# Patient Record
Sex: FEMALE | Race: WHITE | NOT HISPANIC OR LATINO | Employment: UNEMPLOYED | URBAN - NONMETROPOLITAN AREA
[De-identification: names, ages, dates, MRNs, and addresses within clinical notes are randomized per-mention and may not be internally consistent; named-entity substitution may affect disease eponyms.]

---

## 2023-03-03 LAB
CELLS COUNTED TOTAL (#) IN BODY FLUID: 100
CLARITY FLUID: CLEAR
COLOR OF BODY FLUID: YELLOW
ERYTHROCYTES (/UL) IN BODY FLUID: 1000 /UL
JOINT FLUID CRYSTALS: NORMAL
LEUKOCYTES (/UL) IN BODY FLUID: 789 /UL
LYMPHOCYTES/100 LEUKOCYTES IN BODY FLUID BY MAN CT: 6 %
MONOCYTES+MACROPHAGES/100 WBC IN BODY FLUID BY MAN CT: 7 %
NEUTROPHILS/100 LEUKOCYTES IN BODY FLUID BY MANUAL COUNT: 87 %

## 2023-03-06 ENCOUNTER — TELEPHONE (OUTPATIENT)
Dept: PRIMARY CARE | Facility: CLINIC | Age: 83
End: 2023-03-06
Payer: MEDICARE

## 2023-03-06 LAB — CRYSTALS PATH REVIEW: NORMAL

## 2023-03-06 NOTE — TELEPHONE ENCOUNTER
----- Message from Rocael Vallejo DO sent at 3/5/2023  7:44 PM EST -----  Please call the patient regarding her abnormal result. Inform Xray consistent with pseudogout but fluid from knee showed no crystals. Hopefully doing much better after steroid injection   Pt informed of recent results.

## 2023-03-07 PROBLEM — G89.29 CHRONIC PAIN OF LEFT KNEE: Status: ACTIVE | Noted: 2023-03-07

## 2023-03-07 PROBLEM — G25.0 BENIGN FAMILIAL TREMOR: Status: ACTIVE | Noted: 2023-03-07

## 2023-03-07 PROBLEM — E78.00 HYPERCHOLESTEROLEMIA: Status: ACTIVE | Noted: 2023-03-07

## 2023-03-07 PROBLEM — I65.29 CAROTID ARTERY STENOSIS: Status: ACTIVE | Noted: 2023-03-07

## 2023-03-07 PROBLEM — M25.559 JOINT PAIN, HIP: Status: ACTIVE | Noted: 2023-03-07

## 2023-03-07 PROBLEM — I49.3 ASYMPTOMATIC PVCS: Status: ACTIVE | Noted: 2023-03-07

## 2023-03-07 PROBLEM — M11.262 CHONDROCALCINOSIS OF LEFT KNEE: Status: ACTIVE | Noted: 2023-03-07

## 2023-03-07 PROBLEM — I10 BENIGN ESSENTIAL HYPERTENSION: Status: ACTIVE | Noted: 2023-03-07

## 2023-03-07 PROBLEM — M25.562 CHRONIC PAIN OF LEFT KNEE: Status: ACTIVE | Noted: 2023-03-07

## 2023-03-07 PROBLEM — M79.10 MYALGIA: Status: ACTIVE | Noted: 2023-03-07

## 2023-03-07 PROBLEM — M11.269 CHONDROCALCINOSIS OF KNEE: Status: ACTIVE | Noted: 2023-03-07

## 2023-09-26 ENCOUNTER — APPOINTMENT (OUTPATIENT)
Dept: PRIMARY CARE | Facility: CLINIC | Age: 83
End: 2023-09-26
Payer: MEDICARE

## 2023-12-20 ENCOUNTER — OFFICE VISIT (OUTPATIENT)
Dept: PRIMARY CARE | Facility: CLINIC | Age: 83
End: 2023-12-20
Payer: MEDICARE

## 2023-12-20 VITALS
DIASTOLIC BLOOD PRESSURE: 80 MMHG | HEART RATE: 69 BPM | SYSTOLIC BLOOD PRESSURE: 174 MMHG | OXYGEN SATURATION: 96 % | HEIGHT: 60 IN | WEIGHT: 141 LBS | BODY MASS INDEX: 27.68 KG/M2

## 2023-12-20 DIAGNOSIS — Z78.0 POST-MENOPAUSAL: ICD-10-CM

## 2023-12-20 DIAGNOSIS — M17.11 PRIMARY OSTEOARTHRITIS OF RIGHT KNEE: ICD-10-CM

## 2023-12-20 DIAGNOSIS — E78.00 HYPERCHOLESTEROLEMIA: ICD-10-CM

## 2023-12-20 DIAGNOSIS — I10 BENIGN ESSENTIAL HYPERTENSION: Primary | ICD-10-CM

## 2023-12-20 DIAGNOSIS — I49.3 ASYMPTOMATIC PVCS: ICD-10-CM

## 2023-12-20 DIAGNOSIS — M11.261 CHONDROCALCINOSIS OF RIGHT KNEE: ICD-10-CM

## 2023-12-20 DIAGNOSIS — D49.2 SKIN NEOPLASM: ICD-10-CM

## 2023-12-20 LAB
ALBUMIN SERPL BCP-MCNC: 4.3 G/DL (ref 3.4–5)
ANION GAP SERPL CALC-SCNC: 14 MMOL/L (ref 10–20)
BUN SERPL-MCNC: 17 MG/DL (ref 6–23)
CALCIUM SERPL-MCNC: 10 MG/DL (ref 8.6–10.3)
CHLORIDE SERPL-SCNC: 101 MMOL/L (ref 98–107)
CHOLEST SERPL-MCNC: 161 MG/DL (ref 0–199)
CHOLESTEROL/HDL RATIO: 2.5
CO2 SERPL-SCNC: 31 MMOL/L (ref 21–32)
CREAT SERPL-MCNC: 0.73 MG/DL (ref 0.5–1.05)
GFR SERPL CREATININE-BSD FRML MDRD: 81 ML/MIN/1.73M*2
GLUCOSE SERPL-MCNC: 112 MG/DL (ref 74–99)
HDLC SERPL-MCNC: 65.4 MG/DL
LDLC SERPL CALC-MCNC: 72 MG/DL
NON HDL CHOLESTEROL: 96 MG/DL (ref 0–149)
PHOSPHATE SERPL-MCNC: 3.2 MG/DL (ref 2.5–4.9)
POTASSIUM SERPL-SCNC: 4.1 MMOL/L (ref 3.5–5.3)
SODIUM SERPL-SCNC: 142 MMOL/L (ref 136–145)
TRIGL SERPL-MCNC: 120 MG/DL (ref 0–149)
VLDL: 24 MG/DL (ref 0–40)

## 2023-12-20 PROCEDURE — 80069 RENAL FUNCTION PANEL: CPT

## 2023-12-20 PROCEDURE — 20610 DRAIN/INJ JOINT/BURSA W/O US: CPT | Performed by: FAMILY MEDICINE

## 2023-12-20 PROCEDURE — G0439 PPPS, SUBSEQ VISIT: HCPCS | Performed by: FAMILY MEDICINE

## 2023-12-20 PROCEDURE — 3078F DIAST BP <80 MM HG: CPT | Performed by: FAMILY MEDICINE

## 2023-12-20 PROCEDURE — 88305 TISSUE EXAM BY PATHOLOGIST: CPT | Mod: TC,DER | Performed by: FAMILY MEDICINE

## 2023-12-20 PROCEDURE — 1159F MED LIST DOCD IN RCRD: CPT | Performed by: FAMILY MEDICINE

## 2023-12-20 PROCEDURE — 99214 OFFICE O/P EST MOD 30 MIN: CPT | Performed by: FAMILY MEDICINE

## 2023-12-20 PROCEDURE — 36415 COLL VENOUS BLD VENIPUNCTURE: CPT

## 2023-12-20 PROCEDURE — 80061 LIPID PANEL: CPT

## 2023-12-20 PROCEDURE — 1170F FXNL STATUS ASSESSED: CPT | Performed by: FAMILY MEDICINE

## 2023-12-20 PROCEDURE — 88305 TISSUE EXAM BY PATHOLOGIST: CPT | Performed by: DERMATOLOGY

## 2023-12-20 PROCEDURE — 11102 TANGNTL BX SKIN SINGLE LES: CPT | Performed by: FAMILY MEDICINE

## 2023-12-20 PROCEDURE — 3077F SYST BP >= 140 MM HG: CPT | Performed by: FAMILY MEDICINE

## 2023-12-20 PROCEDURE — 1160F RVW MEDS BY RX/DR IN RCRD: CPT | Performed by: FAMILY MEDICINE

## 2023-12-20 PROCEDURE — 1036F TOBACCO NON-USER: CPT | Performed by: FAMILY MEDICINE

## 2023-12-20 RX ORDER — ROSUVASTATIN CALCIUM 10 MG/1
10 TABLET, COATED ORAL NIGHTLY
COMMUNITY
End: 2023-12-20 | Stop reason: SDUPTHER

## 2023-12-20 RX ORDER — HYDROCHLOROTHIAZIDE 12.5 MG/1
12.5 TABLET ORAL
COMMUNITY
End: 2023-12-20 | Stop reason: SDUPTHER

## 2023-12-20 RX ORDER — ROSUVASTATIN CALCIUM 10 MG/1
10 TABLET, COATED ORAL NIGHTLY
Qty: 90 TABLET | Refills: 3 | Status: SHIPPED | OUTPATIENT
Start: 2023-12-20 | End: 2024-12-19

## 2023-12-20 RX ORDER — HYDROCHLOROTHIAZIDE 12.5 MG/1
12.5 TABLET ORAL
Qty: 90 TABLET | Refills: 3 | Status: SHIPPED | OUTPATIENT
Start: 2023-12-20 | End: 2024-12-19

## 2023-12-20 RX ORDER — AMPICILLIN TRIHYDRATE 250 MG
CAPSULE ORAL
COMMUNITY

## 2023-12-20 RX ORDER — ACETAMINOPHEN 500 MG
TABLET ORAL
COMMUNITY

## 2023-12-20 RX ADMIN — LIDOCAINE HYDROCHLORIDE 1.5 ML: 10 INJECTION INFILTRATION; PERINEURAL at 09:18

## 2023-12-20 RX ADMIN — TRIAMCINOLONE ACETONIDE 40 MG: 40 INJECTION, SUSPENSION INTRA-ARTICULAR; INTRAMUSCULAR at 09:18

## 2023-12-20 ASSESSMENT — ACTIVITIES OF DAILY LIVING (ADL)
TAKING_MEDICATION: INDEPENDENT
DOING_HOUSEWORK: INDEPENDENT
BATHING: INDEPENDENT
DRESSING: INDEPENDENT
GROCERY_SHOPPING: INDEPENDENT
MANAGING_FINANCES: INDEPENDENT

## 2023-12-20 ASSESSMENT — ENCOUNTER SYMPTOMS
PALPITATIONS: 0
SINUS PRESSURE: 0
ACTIVITY CHANGE: 1
HYPERTENSION: 1
SHORTNESS OF BREATH: 0
APPETITE CHANGE: 0
CHEST TIGHTNESS: 0
UNEXPECTED WEIGHT CHANGE: 0
WHEEZING: 0
FATIGUE: 0
FEVER: 0

## 2023-12-20 ASSESSMENT — PATIENT HEALTH QUESTIONNAIRE - PHQ9
1. LITTLE INTEREST OR PLEASURE IN DOING THINGS: NOT AT ALL
2. FEELING DOWN, DEPRESSED OR HOPELESS: NOT AT ALL
SUM OF ALL RESPONSES TO PHQ9 QUESTIONS 1 AND 2: 0

## 2023-12-20 NOTE — PROGRESS NOTES
Subjective   Reason for Visit: Adelia Huddleston is an 83 y.o. female here for a Medicare Wellness visit.  And yearly visit  She has history of PVCs hypertension hypercholesterolemia chondrocalcinosis of knees chronic knee pain  Asymptomatic carotid stenosis of note blockage was less than 50% approximately 8 9 years ago.  Patient denies any symptoms consistent with TIA carotid distribution.  She takes statin non-smoker generally pressure controlled needs further adjustment the pressure being elevated today however.    Past Medical, Surgical, and Family History reviewed and updated in chart.    Reviewed all medications by prescribing practitioner or clinical pharmacist (such as prescriptions, OTCs, herbal therapies and supplements) and documented in the medical record.    Hypertension  Pertinent negatives include no chest pain, palpitations or shortness of breath.     R Knee bothering her again   Hard to bend knee  Hard to start walking   She had steroid injection which gave her 4 months of very good relief per her  Has had x-ray of both knees  Patient very much wants to have steroid injection again today  Moderate osteoarthritis bilaterally along with probable CPDD disease  Patient otherwise overall feels fairly good    Patient Care Team:  Rocael Vallejo DO as PCP - General  Rocael Vallejo DO as PCP - [employer] PCP     Review of Systems   Constitutional:  Positive for activity change. Negative for appetite change, fatigue, fever and unexpected weight change.        Discomfort knee is decreased her activity level   HENT:  Negative for congestion, postnasal drip and sinus pressure.    Eyes:  Negative for visual disturbance.   Respiratory:  Negative for chest tightness, shortness of breath and wheezing.    Cardiovascular:  Negative for chest pain, palpitations and leg swelling.       Objective   Vitals:  /80   Pulse 69   Ht 1.524 m (5')   Wt 64 kg (141 lb)   SpO2 96%   BMI 27.54 kg/m²        Physical Exam  General: alert, no apparent distress, good hygiene   HEAD:  Normocephalic, atraumatic    EARS:  EAC patent, TMs normal,   EYES:  sclera white, KAR, conjunctiva noninjected  NOSE: Nasal passages patent   MOUTH: Pharynx clear, tongue uvula midline, grade 2 airway, good dentition for age   Neck:  supple, no masses, thyroid non enlarged non nodular, no cervical adenopathy,  Lungs:  no wheezing, no rales , no rhonchi, normal respiratory pattern, breath sounds not diminished  Heart:  regular rate and  rhythm, 2/6 RUSB murmur, no ectopy, no S3 or S4, no carotid bruits  Abdomen:  soft NT,BS + ,  no organomegaly, no masses, no bruits  Extremities:  trace L>R  edema, no cyanosis, no clubbing,  2+ posterior tibialis pulse    Psych:  speech fluent, normal affect, normal thought process  Skin:  no rashes, + concerning skin lesion anter left ear present many months crusted center skin appear retracted around it , normal texture  R Knee minimal effusion, tender medial and lateral joint line Patient ID: Adelia Huddelston is a 83 y.o. female.    Joint Injection Large/Arthrocentesis: R knee on 12/20/2023 9:18 AM  Details: 22 G needle, anterolateral approach  Medications: 40 mg triamcinolone acetonide 40 mg/mL; 1.5 mL lidocaine 10 mg/mL (1 %)  Aspirate: 33 mL yellow and clear  Consent was given by the patient. Immediately prior to procedure a time out was called to verify the correct patient, procedure, equipment, support staff and site/side marked as required. Patient was prepped and draped in the usual sterile fashion.       Shave biopsy skin  Anesthetized 2% lidocaine with epinephrine 1.2 ml  Shave biopsy performed concerning lesion anterior to left ear  Hyfrecated afterwards good hemostasis was obtained  Bacitracin bandage applied  Patient tolerated very well no complications  Patient understands may need Mohs surgery afterwards if positive for skin cancer which I feel is very likely      Assessment/Plan   Problem  List Items Addressed This Visit       Asymptomatic PVCs    Benign essential hypertension - Primary    Relevant Medications    hydroCHLOROthiazide (HYDRODiuril) 12.5 mg tablet    Other Relevant Orders    Renal Function Panel (Completed)    Chondrocalcinosis of knee    Hypercholesterolemia    Relevant Medications    rosuvastatin (Crestor) 10 mg tablet    Other Relevant Orders    Lipid Panel (Completed)     Other Visit Diagnoses       Skin neoplasm        Relevant Orders    Dermatopathology-[facility]    Post-menopausal        Relevant Orders    XR DEXA bone density    Primary osteoarthritis of right knee        Relevant Orders    Joint Injection Large/Arthrocentesis        Patient is fasting   Phone has no messaging   Patient declines vaccines pneumonia influenza shingles and COVID.  Blood pressure is elevated patient will monitor home will make her medicine lisinopril HCTZ if not at goal otherwise continue hydrochlorothiazide.  She did miss her pill this morning but do not feel that would cause her pressure elevate this much.  Lesion next to left ear felt to be SCC or BCC

## 2023-12-21 ENCOUNTER — TELEPHONE (OUTPATIENT)
Dept: PRIMARY CARE | Facility: CLINIC | Age: 83
End: 2023-12-21
Payer: MEDICARE

## 2023-12-21 DIAGNOSIS — R73.9 HYPERGLYCEMIA: Primary | ICD-10-CM

## 2023-12-21 RX ORDER — LISINOPRIL AND HYDROCHLOROTHIAZIDE 10; 12.5 MG/1; MG/1
1 TABLET ORAL DAILY
Qty: 90 TABLET | Refills: 3 | Status: SHIPPED | OUTPATIENT
Start: 2023-12-21 | End: 2024-12-20

## 2023-12-21 NOTE — TELEPHONE ENCOUNTER
Prescription sent to pharmacy for patient.  Continue to monitor blood pressure if still remains elevated we will send in higher strength on medicine

## 2023-12-26 LAB
LABORATORY COMMENT REPORT: NORMAL
PATH REPORT.FINAL DX SPEC: NORMAL
PATH REPORT.GROSS SPEC: NORMAL
PATH REPORT.MICROSCOPIC SPEC OTHER STN: NORMAL
PATH REPORT.RELEVANT HX SPEC: NORMAL
PATH REPORT.TOTAL CANCER: NORMAL

## 2023-12-26 RX ORDER — LIDOCAINE HYDROCHLORIDE 10 MG/ML
1.5 INJECTION INFILTRATION; PERINEURAL
Status: COMPLETED | OUTPATIENT
Start: 2023-12-20 | End: 2023-12-20

## 2023-12-26 RX ORDER — TRIAMCINOLONE ACETONIDE 40 MG/ML
40 INJECTION, SUSPENSION INTRA-ARTICULAR; INTRAMUSCULAR
Status: COMPLETED | OUTPATIENT
Start: 2023-12-20 | End: 2023-12-20

## 2024-01-18 LAB
LABORATORY COMMENT REPORT: NORMAL
PATH REPORT.FINAL DX SPEC: NORMAL
PATH REPORT.TOTAL CANCER: NORMAL

## 2024-02-20 ENCOUNTER — TELEPHONE (OUTPATIENT)
Dept: PRIMARY CARE | Facility: CLINIC | Age: 84
End: 2024-02-20
Payer: MEDICARE

## 2024-02-20 DIAGNOSIS — D49.2 SKIN NEOPLASM: Primary | ICD-10-CM

## 2024-02-20 NOTE — TELEPHONE ENCOUNTER
Pt saw JT a month ago and got some spots taken off she wanted to see you I assured her that you do MSOs at the end of the day she wants before noon. Wasn't sure if you would do a derm referral for her since your scheduling so far out.

## 2024-02-20 NOTE — TELEPHONE ENCOUNTER
Pt informed. She said that she will just call her cancer specialist. She just thought she'd try here since we're closer.

## 2024-12-09 ENCOUNTER — APPOINTMENT (OUTPATIENT)
Dept: PRIMARY CARE | Facility: CLINIC | Age: 84
End: 2024-12-09
Payer: MEDICARE

## 2024-12-09 VITALS
HEART RATE: 70 BPM | BODY MASS INDEX: 25.78 KG/M2 | DIASTOLIC BLOOD PRESSURE: 70 MMHG | WEIGHT: 132 LBS | SYSTOLIC BLOOD PRESSURE: 136 MMHG | OXYGEN SATURATION: 98 %

## 2024-12-09 DIAGNOSIS — Z00.00 ROUTINE GENERAL MEDICAL EXAMINATION AT HEALTH CARE FACILITY: Primary | ICD-10-CM

## 2024-12-09 DIAGNOSIS — B35.1 NAIL FUNGUS: ICD-10-CM

## 2024-12-09 DIAGNOSIS — R73.09 OTHER ABNORMAL GLUCOSE: ICD-10-CM

## 2024-12-09 DIAGNOSIS — R00.2 PALPITATIONS: ICD-10-CM

## 2024-12-09 DIAGNOSIS — E78.00 HYPERCHOLESTEROLEMIA: ICD-10-CM

## 2024-12-09 DIAGNOSIS — Z13.1 SCREENING FOR DIABETES MELLITUS: ICD-10-CM

## 2024-12-09 DIAGNOSIS — I10 BENIGN ESSENTIAL HYPERTENSION: ICD-10-CM

## 2024-12-09 LAB
ALBUMIN SERPL BCP-MCNC: 4.1 G/DL (ref 3.4–5)
ALP SERPL-CCNC: 39 U/L (ref 33–136)
ALT SERPL W P-5'-P-CCNC: 16 U/L (ref 7–45)
ANION GAP SERPL CALC-SCNC: 13 MMOL/L (ref 10–20)
AST SERPL W P-5'-P-CCNC: 23 U/L (ref 9–39)
BASOPHILS # BLD AUTO: 0.05 X10*3/UL (ref 0–0.1)
BASOPHILS NFR BLD AUTO: 0.6 %
BILIRUB SERPL-MCNC: 0.8 MG/DL (ref 0–1.2)
BUN SERPL-MCNC: 22 MG/DL (ref 6–23)
CALCIUM SERPL-MCNC: 9.6 MG/DL (ref 8.6–10.3)
CHLORIDE SERPL-SCNC: 102 MMOL/L (ref 98–107)
CHOLEST SERPL-MCNC: 158 MG/DL (ref 0–199)
CHOLESTEROL/HDL RATIO: 2.5
CO2 SERPL-SCNC: 31 MMOL/L (ref 21–32)
CREAT SERPL-MCNC: 0.9 MG/DL (ref 0.5–1.05)
EGFRCR SERPLBLD CKD-EPI 2021: 62 ML/MIN/1.73M*2
EOSINOPHIL # BLD AUTO: 0.11 X10*3/UL (ref 0–0.4)
EOSINOPHIL NFR BLD AUTO: 1.3 %
ERYTHROCYTE [DISTWIDTH] IN BLOOD BY AUTOMATED COUNT: 12.7 % (ref 11.5–14.5)
EST. AVERAGE GLUCOSE BLD GHB EST-MCNC: 117 MG/DL
GLUCOSE SERPL-MCNC: 104 MG/DL (ref 74–99)
HBA1C MFR BLD: 5.7 %
HCT VFR BLD AUTO: 47.7 % (ref 36–46)
HDLC SERPL-MCNC: 62.3 MG/DL
HGB BLD-MCNC: 15.6 G/DL (ref 12–16)
IMM GRANULOCYTES # BLD AUTO: 0.03 X10*3/UL (ref 0–0.5)
IMM GRANULOCYTES NFR BLD AUTO: 0.3 % (ref 0–0.9)
LDLC SERPL CALC-MCNC: 63 MG/DL
LYMPHOCYTES # BLD AUTO: 1.85 X10*3/UL (ref 0.8–3)
LYMPHOCYTES NFR BLD AUTO: 21.2 %
MCH RBC QN AUTO: 31.8 PG (ref 26–34)
MCHC RBC AUTO-ENTMCNC: 32.7 G/DL (ref 32–36)
MCV RBC AUTO: 97 FL (ref 80–100)
MONOCYTES # BLD AUTO: 0.5 X10*3/UL (ref 0.05–0.8)
MONOCYTES NFR BLD AUTO: 5.7 %
NEUTROPHILS # BLD AUTO: 6.18 X10*3/UL (ref 1.6–5.5)
NEUTROPHILS NFR BLD AUTO: 70.9 %
NON HDL CHOLESTEROL: 96 MG/DL (ref 0–149)
NRBC BLD-RTO: 0 /100 WBCS (ref 0–0)
PLATELET # BLD AUTO: 161 X10*3/UL (ref 150–450)
POTASSIUM SERPL-SCNC: 4.2 MMOL/L (ref 3.5–5.3)
PROT SERPL-MCNC: 6.2 G/DL (ref 6.4–8.2)
RBC # BLD AUTO: 4.9 X10*6/UL (ref 4–5.2)
SODIUM SERPL-SCNC: 142 MMOL/L (ref 136–145)
TRIGL SERPL-MCNC: 166 MG/DL (ref 0–149)
VLDL: 33 MG/DL (ref 0–40)
WBC # BLD AUTO: 8.7 X10*3/UL (ref 4.4–11.3)

## 2024-12-09 PROCEDURE — 1160F RVW MEDS BY RX/DR IN RCRD: CPT

## 2024-12-09 PROCEDURE — 3075F SYST BP GE 130 - 139MM HG: CPT

## 2024-12-09 PROCEDURE — 85025 COMPLETE CBC W/AUTO DIFF WBC: CPT

## 2024-12-09 PROCEDURE — G0439 PPPS, SUBSEQ VISIT: HCPCS

## 2024-12-09 PROCEDURE — 1170F FXNL STATUS ASSESSED: CPT

## 2024-12-09 PROCEDURE — 80053 COMPREHEN METABOLIC PANEL: CPT

## 2024-12-09 PROCEDURE — 1159F MED LIST DOCD IN RCRD: CPT

## 2024-12-09 PROCEDURE — 93000 ELECTROCARDIOGRAM COMPLETE: CPT

## 2024-12-09 PROCEDURE — 1036F TOBACCO NON-USER: CPT

## 2024-12-09 PROCEDURE — 83036 HEMOGLOBIN GLYCOSYLATED A1C: CPT

## 2024-12-09 PROCEDURE — 3078F DIAST BP <80 MM HG: CPT

## 2024-12-09 PROCEDURE — 80061 LIPID PANEL: CPT

## 2024-12-09 RX ORDER — CICLOPIROX OLAMINE 7.7 MG/100ML
1 SUSPENSION TOPICAL 2 TIMES DAILY
Qty: 30 ML | Refills: 0 | Status: SHIPPED | OUTPATIENT
Start: 2024-12-09

## 2024-12-09 RX ORDER — ROSUVASTATIN CALCIUM 10 MG/1
10 TABLET, COATED ORAL NIGHTLY
Qty: 90 TABLET | Refills: 3 | Status: SHIPPED | OUTPATIENT
Start: 2024-12-09 | End: 2025-12-09

## 2024-12-09 RX ORDER — LISINOPRIL AND HYDROCHLOROTHIAZIDE 10; 12.5 MG/1; MG/1
1 TABLET ORAL DAILY
Qty: 90 TABLET | Refills: 3 | Status: SHIPPED | OUTPATIENT
Start: 2024-12-09 | End: 2025-12-09

## 2024-12-09 ASSESSMENT — PATIENT HEALTH QUESTIONNAIRE - PHQ9
SUM OF ALL RESPONSES TO PHQ9 QUESTIONS 1 AND 2: 0
1. LITTLE INTEREST OR PLEASURE IN DOING THINGS: NOT AT ALL
2. FEELING DOWN, DEPRESSED OR HOPELESS: NOT AT ALL

## 2024-12-09 ASSESSMENT — ACTIVITIES OF DAILY LIVING (ADL)
GROCERY_SHOPPING: INDEPENDENT
MANAGING_FINANCES: INDEPENDENT
DRESSING: INDEPENDENT
BATHING: INDEPENDENT
DOING_HOUSEWORK: INDEPENDENT
TAKING_MEDICATION: INDEPENDENT

## 2024-12-09 NOTE — PROGRESS NOTES
Subjective   Reason for Visit: Adelia Huddleston is an 84 y.o. female here for a Medicare Wellness visit.     Past Medical, Surgical, and Family History reviewed and updated in chart.    Reviewed all medications by prescribing practitioner or clinical pharmacist (such as prescriptions, OTCs, herbal therapies and supplements) and documented in the medical record.    HPI  Adelia presents for medicare wellness  Last visit with Dr. Vallejo was 12/20/2023    Has noticed she has had a couple weak spells  Has noticed heart has beat fast a couple times, does not happen very often   Denies syncopal episodes  No headache, no chest pain, no SOB     HTN  -Switched from hydrochlorothiazide 12.5mg to lisinopril hydrochlorothiazide 10-12.5mg at last visit with Dr. Vallejo     Patient Care Team:  Kael JOHNSON DO as PCP - General (Family Medicine)  Rocael Vallejo DO as PCP - [employer] PCP     Review of Systems  10 point review of systems performed and is negative except as noted in the HPI.    Current Outpatient Medications on File Prior to Visit   Medication Sig Dispense Refill    cholecalciferol (Vitamin D-3) 50 mcg (2,000 unit) capsule Take by mouth.      fish oil concentrate (Omega-3) 120-180 mg capsule Take 1 capsule (1 g) by mouth 2 times a day.      red yeast rice 600 mg capsule Take by mouth once daily.       No current facility-administered medications on file prior to visit.     Objective   Vitals:  /70   Pulse 70   Wt 59.9 kg (132 lb)   SpO2 98%   BMI 25.78 kg/m²       Physical Exam  Constitutional:       General: She is not in acute distress.     Appearance: Normal appearance. She is not ill-appearing or toxic-appearing.   HENT:      Head: Normocephalic and atraumatic.      Right Ear: Ear canal and external ear normal. There is impacted cerumen.      Left Ear: Ear canal and external ear normal. There is impacted cerumen.      Nose: Nose normal.      Mouth/Throat:      Mouth: Mucous membranes are  moist.      Pharynx: Oropharynx is clear. No oropharyngeal exudate or posterior oropharyngeal erythema.   Eyes:      Conjunctiva/sclera: Conjunctivae normal.      Pupils: Pupils are equal, round, and reactive to light.   Neck:      Vascular: No carotid bruit.   Cardiovascular:      Rate and Rhythm: Normal rate. Rhythm irregular.      Pulses: Normal pulses.      Heart sounds: Murmur (RUSB) heard.   Pulmonary:      Effort: Pulmonary effort is normal.      Breath sounds: Normal breath sounds. No wheezing, rhonchi or rales.   Abdominal:      General: Bowel sounds are normal. There is no distension.      Palpations: Abdomen is soft. There is no mass.      Tenderness: There is no abdominal tenderness. There is no guarding or rebound.   Musculoskeletal:         General: Normal range of motion.      Cervical back: Normal range of motion and neck supple.   Lymphadenopathy:      Cervical: No cervical adenopathy.   Skin:     General: Skin is warm and dry.      Comments: Nails of L hand are yellowed and have some overgrowth and separation from the nailbed.    Neurological:      Mental Status: She is alert and oriented to person, place, and time.   Psychiatric:         Mood and Affect: Mood normal.         Behavior: Behavior normal.         Assessment & Plan  Routine general medical examination at health care facility         Benign essential hypertension  Continue lisinopril hydrochlorothiazide 10-12.5mg  Orders:    CBC and Auto Differential    lisinopriL-hydrochlorothiazide 10-12.5 mg tablet; Take 1 tablet by mouth once daily.    Hypercholesterolemia  Low cholesterol diet   Orders:    Lipid Panel    rosuvastatin (Crestor) 10 mg tablet; Take 1 tablet (10 mg) by mouth once daily at bedtime.    Palpitations  Per previous note from Dr. Vallejo she has a history of PVCs  ECG - sinus arrhythmia, reviewed with Dr. Willis  Did discuss holter monitor, she declines at this time   Discussed echo, she also declines at this time    Orders:    ECG 12 Lead    Screening for diabetes mellitus    Orders:    Comprehensive Metabolic Panel    Hemoglobin A1C    Other abnormal glucose    Orders:    Hemoglobin A1C    Nail fungus  Apply ciclopirox, discussed can take 40 weeks to notice improvement   Orders:    ciclopirox (Loprox) 0.77 % suspension; Apply 1 Application topically 2 times a day.    Asx carotid stenosis - blockage was less than 50% approximately 8-9 years ago.     Health Maintenance Reminder:  -Blood Work: today  -Hep C:   -Colonoscopy: aged out  -Mammo: aged out  -Dexa >65y: order from 2023 did not do, will consider   -Pap: aged out  -Lung CA Screen: 50-80 - non smoker and aged out  -Shingrix >50: decline  -Pneumovax >65y, <65 if at risk, 5y between <65 and >65 dose: decline  -Flu: decline       Discussed at visit any disease processes that were of concern as well as the risks, benefits and instructions on any new medication provided. Patient (and/or caretaker of patient if present) stated all questions were answered, and they voiced understanding of instructions.

## 2024-12-09 NOTE — ASSESSMENT & PLAN NOTE
Low cholesterol diet   Orders:    Lipid Panel    rosuvastatin (Crestor) 10 mg tablet; Take 1 tablet (10 mg) by mouth once daily at bedtime.

## 2024-12-09 NOTE — PATIENT INSTRUCTIONS
PVC - premature ventricular contractions - PVCs are very common and usually harmless, especially if they only happen occasionally. They can cause a variety of symptoms, including: Feeling like your heart skipped a beat, A fluttering sensation in your chest, and Dizziness or weakness. In most cases, PVCs don't require treatment.       Refills for a year    Let me know if the extra heart beats become more frequent or worse - can do heart monitor and echo     Start solution on your nails - give 3-4 months, let me know if it does not help

## 2025-03-09 ENCOUNTER — HOSPITAL ENCOUNTER (EMERGENCY)
Facility: HOSPITAL | Age: 85
Discharge: HOME | End: 2025-03-10
Attending: EMERGENCY MEDICINE
Payer: MEDICARE

## 2025-03-09 ENCOUNTER — APPOINTMENT (OUTPATIENT)
Dept: RADIOLOGY | Facility: HOSPITAL | Age: 85
End: 2025-03-09
Payer: MEDICARE

## 2025-03-09 ENCOUNTER — HOSPITAL ENCOUNTER (EMERGENCY)
Facility: HOSPITAL | Age: 85
Discharge: OTHER INSTITUTION | End: 2025-03-09
Attending: STUDENT IN AN ORGANIZED HEALTH CARE EDUCATION/TRAINING PROGRAM
Payer: MEDICARE

## 2025-03-09 ENCOUNTER — APPOINTMENT (OUTPATIENT)
Dept: CARDIOLOGY | Facility: HOSPITAL | Age: 85
End: 2025-03-09
Payer: MEDICARE

## 2025-03-09 VITALS
TEMPERATURE: 99.1 F | DIASTOLIC BLOOD PRESSURE: 55 MMHG | HEIGHT: 65 IN | SYSTOLIC BLOOD PRESSURE: 136 MMHG | OXYGEN SATURATION: 94 % | WEIGHT: 135 LBS | HEART RATE: 78 BPM | RESPIRATION RATE: 18 BRPM | BODY MASS INDEX: 22.49 KG/M2

## 2025-03-09 DIAGNOSIS — H53.9 VISION CHANGES: Primary | ICD-10-CM

## 2025-03-09 DIAGNOSIS — G45.9 TIA (TRANSIENT ISCHEMIC ATTACK): Primary | ICD-10-CM

## 2025-03-09 LAB
ALBUMIN SERPL BCP-MCNC: 4.3 G/DL (ref 3.4–5)
ALP SERPL-CCNC: 43 U/L (ref 33–136)
ALT SERPL W P-5'-P-CCNC: 14 U/L (ref 7–45)
ANION GAP SERPL CALC-SCNC: 13 MMOL/L (ref 10–20)
AST SERPL W P-5'-P-CCNC: 19 U/L (ref 9–39)
BASOPHILS # BLD AUTO: 0.04 X10*3/UL (ref 0–0.1)
BASOPHILS NFR BLD AUTO: 0.5 %
BILIRUB SERPL-MCNC: 0.6 MG/DL (ref 0–1.2)
BUN SERPL-MCNC: 22 MG/DL (ref 6–23)
CALCIUM SERPL-MCNC: 9.7 MG/DL (ref 8.6–10.3)
CARDIAC TROPONIN I PNL SERPL HS: 5 NG/L (ref 0–13)
CARDIAC TROPONIN I PNL SERPL HS: 7 NG/L (ref 0–13)
CHLORIDE SERPL-SCNC: 104 MMOL/L (ref 98–107)
CO2 SERPL-SCNC: 27 MMOL/L (ref 21–32)
CREAT SERPL-MCNC: 0.95 MG/DL (ref 0.5–1.05)
EGFRCR SERPLBLD CKD-EPI 2021: 58 ML/MIN/1.73M*2
EOSINOPHIL # BLD AUTO: 0.12 X10*3/UL (ref 0–0.4)
EOSINOPHIL NFR BLD AUTO: 1.4 %
ERYTHROCYTE [DISTWIDTH] IN BLOOD BY AUTOMATED COUNT: 12.1 % (ref 11.5–14.5)
GLUCOSE SERPL-MCNC: 162 MG/DL (ref 74–99)
HCT VFR BLD AUTO: 43.8 % (ref 36–46)
HGB BLD-MCNC: 15 G/DL (ref 12–16)
IMM GRANULOCYTES # BLD AUTO: 0.03 X10*3/UL (ref 0–0.5)
IMM GRANULOCYTES NFR BLD AUTO: 0.3 % (ref 0–0.9)
LYMPHOCYTES # BLD AUTO: 2.34 X10*3/UL (ref 0.8–3)
LYMPHOCYTES NFR BLD AUTO: 26.9 %
MAGNESIUM SERPL-MCNC: 2.08 MG/DL (ref 1.6–2.4)
MCH RBC QN AUTO: 31.9 PG (ref 26–34)
MCHC RBC AUTO-ENTMCNC: 34.2 G/DL (ref 32–36)
MCV RBC AUTO: 93 FL (ref 80–100)
MONOCYTES # BLD AUTO: 0.48 X10*3/UL (ref 0.05–0.8)
MONOCYTES NFR BLD AUTO: 5.5 %
NEUTROPHILS # BLD AUTO: 5.68 X10*3/UL (ref 1.6–5.5)
NEUTROPHILS NFR BLD AUTO: 65.4 %
NRBC BLD-RTO: 0 /100 WBCS (ref 0–0)
PLATELET # BLD AUTO: 209 X10*3/UL (ref 150–450)
POTASSIUM SERPL-SCNC: 3.2 MMOL/L (ref 3.5–5.3)
PROT SERPL-MCNC: 7.1 G/DL (ref 6.4–8.2)
RBC # BLD AUTO: 4.7 X10*6/UL (ref 4–5.2)
SODIUM SERPL-SCNC: 141 MMOL/L (ref 136–145)
WBC # BLD AUTO: 8.7 X10*3/UL (ref 4.4–11.3)

## 2025-03-09 PROCEDURE — 70544 MR ANGIOGRAPHY HEAD W/O DYE: CPT

## 2025-03-09 PROCEDURE — 99285 EMERGENCY DEPT VISIT HI MDM: CPT | Mod: 25 | Performed by: STUDENT IN AN ORGANIZED HEALTH CARE EDUCATION/TRAINING PROGRAM

## 2025-03-09 PROCEDURE — 84484 ASSAY OF TROPONIN QUANT: CPT | Performed by: PHYSICIAN ASSISTANT

## 2025-03-09 PROCEDURE — 36415 COLL VENOUS BLD VENIPUNCTURE: CPT | Performed by: PHYSICIAN ASSISTANT

## 2025-03-09 PROCEDURE — 70450 CT HEAD/BRAIN W/O DYE: CPT | Performed by: RADIOLOGY

## 2025-03-09 PROCEDURE — 70551 MRI BRAIN STEM W/O DYE: CPT

## 2025-03-09 PROCEDURE — 70450 CT HEAD/BRAIN W/O DYE: CPT

## 2025-03-09 PROCEDURE — 96374 THER/PROPH/DIAG INJ IV PUSH: CPT

## 2025-03-09 PROCEDURE — 83735 ASSAY OF MAGNESIUM: CPT | Performed by: STUDENT IN AN ORGANIZED HEALTH CARE EDUCATION/TRAINING PROGRAM

## 2025-03-09 PROCEDURE — 2500000002 HC RX 250 W HCPCS SELF ADMINISTERED DRUGS (ALT 637 FOR MEDICARE OP, ALT 636 FOR OP/ED): Mod: MUE | Performed by: PHYSICIAN ASSISTANT

## 2025-03-09 PROCEDURE — 93005 ELECTROCARDIOGRAM TRACING: CPT

## 2025-03-09 PROCEDURE — 80053 COMPREHEN METABOLIC PANEL: CPT | Performed by: PHYSICIAN ASSISTANT

## 2025-03-09 PROCEDURE — 70547 MR ANGIOGRAPHY NECK W/O DYE: CPT

## 2025-03-09 PROCEDURE — 85025 COMPLETE CBC W/AUTO DIFF WBC: CPT | Performed by: PHYSICIAN ASSISTANT

## 2025-03-09 PROCEDURE — 99284 EMERGENCY DEPT VISIT MOD MDM: CPT | Performed by: EMERGENCY MEDICINE

## 2025-03-09 PROCEDURE — 2500000004 HC RX 250 GENERAL PHARMACY W/ HCPCS (ALT 636 FOR OP/ED): Performed by: PHYSICIAN ASSISTANT

## 2025-03-09 RX ORDER — POTASSIUM CHLORIDE 20 MEQ/1
40 TABLET, EXTENDED RELEASE ORAL ONCE
Status: COMPLETED | OUTPATIENT
Start: 2025-03-09 | End: 2025-03-09

## 2025-03-09 RX ORDER — LORAZEPAM 2 MG/ML
1 INJECTION INTRAMUSCULAR ONCE
Status: COMPLETED | OUTPATIENT
Start: 2025-03-09 | End: 2025-03-09

## 2025-03-09 RX ADMIN — LORAZEPAM 1 MG: 2 INJECTION INTRAMUSCULAR; INTRAVENOUS at 17:23

## 2025-03-09 RX ADMIN — POTASSIUM CHLORIDE 40 MEQ: 1500 TABLET, EXTENDED RELEASE ORAL at 15:53

## 2025-03-09 ASSESSMENT — LIFESTYLE VARIABLES
EVER FELT BAD OR GUILTY ABOUT YOUR DRINKING: NO
HAVE YOU EVER FELT YOU SHOULD CUT DOWN ON YOUR DRINKING: NO
HAVE PEOPLE ANNOYED YOU BY CRITICIZING YOUR DRINKING: NO
EVER HAD A DRINK FIRST THING IN THE MORNING TO STEADY YOUR NERVES TO GET RID OF A HANGOVER: NO
HAVE PEOPLE ANNOYED YOU BY CRITICIZING YOUR DRINKING: NO
EVER HAD A DRINK FIRST THING IN THE MORNING TO STEADY YOUR NERVES TO GET RID OF A HANGOVER: NO
TOTAL SCORE: 0
HAVE YOU EVER FELT YOU SHOULD CUT DOWN ON YOUR DRINKING: NO
TOTAL SCORE: 0
EVER FELT BAD OR GUILTY ABOUT YOUR DRINKING: NO

## 2025-03-09 ASSESSMENT — COLUMBIA-SUICIDE SEVERITY RATING SCALE - C-SSRS
2. HAVE YOU ACTUALLY HAD ANY THOUGHTS OF KILLING YOURSELF?: NO
1. IN THE PAST MONTH, HAVE YOU WISHED YOU WERE DEAD OR WISHED YOU COULD GO TO SLEEP AND NOT WAKE UP?: NO
6. HAVE YOU EVER DONE ANYTHING, STARTED TO DO ANYTHING, OR PREPARED TO DO ANYTHING TO END YOUR LIFE?: NO
2. HAVE YOU ACTUALLY HAD ANY THOUGHTS OF KILLING YOURSELF?: NO
1. IN THE PAST MONTH, HAVE YOU WISHED YOU WERE DEAD OR WISHED YOU COULD GO TO SLEEP AND NOT WAKE UP?: NO
6. HAVE YOU EVER DONE ANYTHING, STARTED TO DO ANYTHING, OR PREPARED TO DO ANYTHING TO END YOUR LIFE?: NO

## 2025-03-09 ASSESSMENT — PAIN - FUNCTIONAL ASSESSMENT: PAIN_FUNCTIONAL_ASSESSMENT: 0-10

## 2025-03-09 ASSESSMENT — PAIN SCALES - GENERAL: PAINLEVEL_OUTOF10: 0 - NO PAIN

## 2025-03-09 NOTE — ED TRIAGE NOTES
Patient from home at 12:30 pm today began to have a headache dizziness and double vision. BP for ems 196/77. Patient is A&Ox4 gcs 15 NIH 0

## 2025-03-09 NOTE — ED PROVIDER NOTES
HPI   Chief Complaint   Patient presents with    Hypertension    Dizziness       History of present illness:  84-year-old female presents emergency room for complaints of double vision and vertigo.  The patient states beginning of symptoms about 2 hours ago.  She states it came on suddenly and she began developing what she felt like was vertigo stating that the room felt like it was spinning about her.  She states that in addition as though she began developing double vision and states it appears that there is to everything.  She denies any falls or head injury or recent trauma or nausea or vomiting or palpitations or chest pain.  She states that she has had cataracts in the past but is never had any other issues of her eyes.  She denies any headache at this time.  She states that she takes medications for hypertension hyperlipidemia.  She denies any other symptoms at this time.    Social history: Negative for alcohol and drug use.    Review of systems:   Gen.: No weight loss, fatigue, anorexia, insomnia, fever.   Eyes: No vision loss,  drainage, eye pain.   ENT: No pharyngitis, dry mouth.   Cardiac: No chest pain, palpitations, syncope, near syncope.   Pulmonary: No shortness of breath, cough, hemoptysis.   Heme/lymph: No swollen glands, fever, bleeding.   GI: No abdominal pain, change in bowel habits, melena, hematemesis, hematochezia, nausea, vomiting, diarrhea.   : No discharge, dysuria, frequency, urgency, hematuria.   Musculoskeletal: No limb pain, joint pain, joint swelling.   Skin: No rashes.   Review of systems is otherwise negative unless stated above or in history of present illness.        Physical exam:  General: Vitals noted, no distress. Afebrile.   EENT: No lymphadenopathy appreciated  Cardiac: Regular, rate, rhythm, no murmur.   Pulmonary: Lungs clear bilaterally with good aeration. No adventitious breath sounds.   Abdomen: Soft, nonsurgical. Nontender. No peritoneal signs. Normoactive bowel  sounds.   Extremities: No peripheral edema.   Skin: No rash.   Neuro: No focal neurologic deficits, NIH of 0      Medical decision making:   Testing: CMP showed potassium 3.2 which was replaced with oral potassium troponin x 2 was negative magnesium 2.08 CBC unremarkable CT scan of the head did not show acute findings interpreted by radiology, MRI of the brain MRA of the head and neck did not show any acute findings at this time as interpreted by radiology  Plan: 84-year-old female presents emergency room for complaints of double vision and vertigo.  The patient states beginning of symptoms about 2 hours ago.  She states it came on suddenly and she began developing what she felt like was vertigo stating that the room felt like it was spinning about her.  She states that in addition as though she began developing double vision and states it appears that there is to everything.  She denies any falls or head injury or recent trauma or nausea or vomiting or palpitations or chest pain.  She states that she has had cataracts in the past but is never had any other issues of her eyes.  She denies any headache at this time.  She states that she takes medications for hypertension hyperlipidemia.  She denies any other symptoms at this time. Neuro: No focal neurologic deficits, NIH of 0.  On exam of the patient's double vision apparently was improved whenever she covered 1 eye of the other.  I did not appreciate any obvious palsy on exam of the eyes .  I explained to the patient the test results at this time and explained to her that I felt that it might be best for her to be transferred to [facility] to be seen by ophthalmology as the MRI and MRAs were negative.  She was in agreement of this plan as well as the family at this time.  The care of the patient was handed over to oncoming attending physician at this time pending transfer to [facility] to be evaluated by ophthalmology.  I did speak with Dr. Marino of  ophthalmology as well who is in agreement this plan the patient will be transferred to the ER at this time.    Impression:   1. Double vision      EKG taken on March 9, 2025 at 1444 shows normal sinus rhythm at 71 no STEMI no T wave inversion normal axis as interpreted by myself      History provided by:  Patient   used: No            Patient History   No past medical history on file.  Past Surgical History:   Procedure Laterality Date    CATARACT EXTRACTION  07/05/2013    Cataract Surgery     No family history on file.  Social History     Tobacco Use    Smoking status: Never    Smokeless tobacco: Never   Substance Use Topics    Alcohol use: Never    Drug use: Never       Physical Exam   ED Triage Vitals   Temperature Heart Rate Respirations BP   03/09/25 1424 03/09/25 1424 03/09/25 1424 03/09/25 1424   37.3 °C (99.1 °F) 79 15 177/70      Pulse Ox Temp src Heart Rate Source Patient Position   03/09/25 1440 -- -- --   (!) 93 %         BP Location FiO2 (%)     -- --             Physical Exam      ED Course & MDM   Diagnoses as of 03/10/25 2312   Vision changes                 No data recorded     [place] Coma Scale Score: 15 (03/09/25 1434 : Taj Kirk RN)       NIH Stroke Scale: 0 (03/09/25 1431 : Abundio Palm PA-C)                   Medical Decision Making      Procedure  Procedures     Abundio Palm PA-C  03/10/25 0047

## 2025-03-09 NOTE — PROGRESS NOTES
For full history, physical exam, and prior hospital course, please see previous ED provider note. This is in addition to the primary record.    Chief Complaint   Patient presents with    Hypertension    Dizziness       Comments/Additional Findings: Patient was signed out to me by Dr Singh at change of shift.   Pending items at this time include transfer to [facility].  Patient had no acute events prior to transfer.            Diagnoses as of 03/09/25 1911   Vision changes       MR angio neck wo IV contrast   Final Result   MRI Brain:        1. Nonspecific white matter changes most likely represent   small-vessel ischemic disease in a patient of this age and also   involve the hank. No evidence for acute ischemic injury.   2. Inflammatory changes of the paranasal sinuses. There is a small   amount of fluid in the left maxillary sinus which could reflect acute   sinusitis in the appropriate clinical setting.             MRA:        1. Limited, motion degraded MRA of the head without proximal large   branch vessel cutoff. Short segment of narrowing of the distal M1   segment of the left MCA is not excluded although this is favored to   be due to motion degradation.   2. Sclerotic changes of the left carotid bifurcation and proximal   left ICA with mild narrowing.   3. Mass in the left neck on source images from MRA of the neck is   inadequately evaluated on the basis of this examination but thought   to most likely be arising from the thyroid gland.        MACRO:   Incidental Finding:  There are few small hypoattenuating nodules   measuring equal to or greater than 1.5 cm in the thyroid gland.   (**-YCF-**)        Instructions:  Further evaluation with nonemergent thyroid ultrasound.   (Managing Incidental Thyroid Nodules Detected on Imaging: White Paper   of the ACR Incidental Thyroid Findings Committee. Kelley Ya. et   al. Journal of the American College of Radiology,Volume 12, Issue 2,   143 - 150.)  [email or web address].ACR.IF.4        Signed by: Radha Mcarthur 3/9/2025 6:27 PM   Dictation workstation:   [id number]      MR angio head wo IV contrast   Final Result   MRI Brain:        1. Nonspecific white matter changes most likely represent   small-vessel ischemic disease in a patient of this age and also   involve the hank. No evidence for acute ischemic injury.   2. Inflammatory changes of the paranasal sinuses. There is a small   amount of fluid in the left maxillary sinus which could reflect acute   sinusitis in the appropriate clinical setting.             MRA:        1. Limited, motion degraded MRA of the head without proximal large   branch vessel cutoff. Short segment of narrowing of the distal M1   segment of the left MCA is not excluded although this is favored to   be due to motion degradation.   2. Sclerotic changes of the left carotid bifurcation and proximal   left ICA with mild narrowing.   3. Mass in the left neck on source images from MRA of the neck is   inadequately evaluated on the basis of this examination but thought   to most likely be arising from the thyroid gland.        MACRO:   Incidental Finding:  There are few small hypoattenuating nodules   measuring equal to or greater than 1.5 cm in the thyroid gland.   (**-YCF-**)        Instructions:  Further evaluation with nonemergent thyroid ultrasound.   (Managing Incidental Thyroid Nodules Detected on Imaging: White Paper   of the ACR Incidental Thyroid Findings Committee. Kelley Ya. et   al. Journal of the American College of Radiology,Volume 12, Issue 2,   143 - 150.) [email or web address].[email or web address]        Signed by: Radha Mcarthur 3/9/2025 6:27 PM   Dictation workstation:   [id number]      MR brain wo IV contrast   Final Result   MRI Brain:        1. Nonspecific white matter changes most likely represent   small-vessel ischemic disease in a patient of this age and also   involve the hank. No evidence for acute ischemic injury.   2. Inflammatory changes  of the paranasal sinuses. There is a small   amount of fluid in the left maxillary sinus which could reflect acute   sinusitis in the appropriate clinical setting.             MRA:        1. Limited, motion degraded MRA of the head without proximal large   branch vessel cutoff. Short segment of narrowing of the distal M1   segment of the left MCA is not excluded although this is favored to   be due to motion degradation.   2. Sclerotic changes of the left carotid bifurcation and proximal   left ICA with mild narrowing.   3. Mass in the left neck on source images from MRA of the neck is   inadequately evaluated on the basis of this examination but thought   to most likely be arising from the thyroid gland.        MACRO:   Incidental Finding:  There are few small hypoattenuating nodules   measuring equal to or greater than 1.5 cm in the thyroid gland.   (**-YCF-**)        Instructions:  Further evaluation with nonemergent thyroid ultrasound.   (Managing Incidental Thyroid Nodules Detected on Imaging: White Paper   of the ACR Incidental Thyroid Findings Committee. Kelley Ya. et   al. Journal of the American College of Radiology,Volume 12, Issue 2,   143 - 150.) [email or web address].IF.[email or web address]        Signed by: Radha Mcarthur 3/9/2025 6:27 PM   Dictation workstation:   [id number]      CT head wo IV contrast   Final Result   No evidence of an acute intracranial process.        MACRO:   None.        Signed by: Bruce Mallory 3/9/2025 3:33 PM   Dictation workstation:   [id number]        Labs Reviewed   CBC WITH AUTO DIFFERENTIAL - Abnormal       Result Value    WBC 8.7      nRBC 0.0      RBC 4.70      Hemoglobin 15.0      Hematocrit 43.8      MCV 93      MCH 31.9      MCHC 34.2      RDW 12.1      Platelets 209      Neutrophils % 65.4      Immature Granulocytes %, Automated 0.3      Lymphocytes % 26.9      Monocytes % 5.5      Eosinophils % 1.4      Basophils % 0.5      Neutrophils Absolute 5.68 (*)     Immature Granulocytes  Absolute, Automated 0.03      Lymphocytes Absolute 2.34      Monocytes Absolute 0.48      Eosinophils Absolute 0.12      Basophils Absolute 0.04     COMPREHENSIVE METABOLIC PANEL - Abnormal    Glucose 162 (*)     Sodium 141      Potassium 3.2 (*)     Chloride 104      Bicarbonate 27      Anion Gap 13      Urea Nitrogen 22      Creatinine 0.95      eGFR 58 (*)     Calcium 9.7      Albumin 4.3      Alkaline Phosphatase 43      Total Protein 7.1      AST 19      Bilirubin, Total 0.6      ALT 14     SERIAL TROPONIN-INITIAL - Normal    Troponin I, High Sensitivity 5      Narrative:     Less than 99th percentile of normal range cutoff-  Female and children under 18 years old <14 ng/L; Male <21 ng/L: Negative  Repeat testing should be performed if clinically indicated.     Female and children under 18 years old 14-50 ng/L; Male 21-50 ng/L:  Consistent with possible cardiac damage and possible increased clinical   risk. Serial measurements may help to assess extent of myocardial damage.     >50 ng/L: Consistent with cardiac damage, increased clinical risk and  myocardial infarction. Serial measurements may help assess extent of   myocardial damage.      NOTE: Children less than 1 year old may have higher baseline troponin   levels and results should be interpreted in conjunction with the overall   clinical context.     NOTE: Troponin I testing is performed using a different   testing methodology at [facility] than at other   [facility]. Direct result comparisons should only   be made within the same method.   SERIAL TROPONIN, 1 HOUR - Normal    Troponin I, High Sensitivity 7      Narrative:     Less than 99th percentile of normal range cutoff-  Female and children under 18 years old <14 ng/L; Male <21 ng/L: Negative  Repeat testing should be performed if clinically indicated.     Female and children under 18 years old 14-50 ng/L; Male 21-50 ng/L:  Consistent with possible cardiac damage and possible  increased clinical   risk. Serial measurements may help to assess extent of myocardial damage.     >50 ng/L: Consistent with cardiac damage, increased clinical risk and  myocardial infarction. Serial measurements may help assess extent of   myocardial damage.      NOTE: Children less than 1 year old may have higher baseline troponin   levels and results should be interpreted in conjunction with the overall   clinical context.     NOTE: Troponin I testing is performed using a different   testing methodology at [facility] than at other   [facility]. Direct result comparisons should only   be made within the same method.   MAGNESIUM - Normal    Magnesium 2.08     TROPONIN SERIES- (INITIAL, 1 HR)    Narrative:     The following orders were created for panel order Troponin I Series, High Sensitivity (0, 1 HR).  Procedure                               Abnormality         Status                     ---------                               -----------         ------                     Troponin I, High Sensiti...[[id number]]  Normal              Final result               Troponin, High Sensitivi...[[id number]]  Normal              Final result                 Please view results for these tests on the individual orders.           Procedure  Procedures             Note: This note was dictated by speech recognition. Minor errors in transcription may be present.

## 2025-03-10 VITALS
WEIGHT: 135 LBS | HEIGHT: 60 IN | HEART RATE: 66 BPM | RESPIRATION RATE: 18 BRPM | TEMPERATURE: 97.6 F | BODY MASS INDEX: 26.5 KG/M2 | OXYGEN SATURATION: 95 % | DIASTOLIC BLOOD PRESSURE: 90 MMHG | SYSTOLIC BLOOD PRESSURE: 179 MMHG

## 2025-03-10 LAB
ATRIAL RATE: 71 BPM
P AXIS: 39 DEGREES
P OFFSET: 186 MS
P ONSET: 140 MS
PR INTERVAL: 158 MS
Q ONSET: 219 MS
QRS COUNT: 12 BEATS
QRS DURATION: 78 MS
QT INTERVAL: 378 MS
QTC CALCULATION(BAZETT): 410 MS
QTC FREDERICIA: 400 MS
R AXIS: 39 DEGREES
T AXIS: 57 DEGREES
T OFFSET: 408 MS
VENTRICULAR RATE: 71 BPM

## 2025-03-10 RX ORDER — ASPIRIN 81 MG/1
81 TABLET ORAL DAILY
Qty: 30 TABLET | Refills: 11 | Status: SHIPPED | OUTPATIENT
Start: 2025-03-10 | End: 2026-03-10

## 2025-03-10 NOTE — ED TRIAGE NOTES
Pt was BIB [facility] as a transfer from [facility] for an Optho consult. Patient presented to [facility] from home reported that at 12:30 pm today she began to have a headache dizziness and double vision. BP for ems 196/77. Patient is A&Ox4 gcs 15 NIH 0. Upon presentation to [facility] pt is alert and oriented she endorses double vision and blurry vision her BP is 174/80

## 2025-03-10 NOTE — DISCHARGE INSTRUCTIONS
You were seen today for blurry vision.  You initially had some lightheadedness and worsening blurry vision.  They got MRIs, however, the workup there was normal, so they sent you here to be seen by ophthalmology, our eye doctors.  They did not see anything in her eyes that was concerning for a reason for your blurry vision, otherwise known as diplopia.  Additionally, your symptoms had improved.  Given this, the most likely thing that this was was a TIA.  We often have people take aspirin, which I will provide a prescription for, to help reduce the risk of these.  Additionally, there are some other causes that can be diagnosed with an ultrasound.  You should follow-up with your primary care doctor to discuss whether or not you need these test.    Additionally, you were incidentally found to have some nodules of your thyroid.  You should likely get an ultrasound of your thyroid with your primary care doctor as well.

## 2025-03-10 NOTE — ED PROVIDER NOTES
History of Present Illness     History provided by: Patient and Significant Other  Limitations to History: None  External Records Reviewed with Brief Summary:  ED discharge summary from [facility], initial presentation for diplopia and vertigo, normal imaging and labs there, sent here for ophthalmologic evaluation    HPI:  Adelia Huddleston is a 84 y.o. female with a past medical history of hypertension who presents today for vertigo and diplopia.  Patient was initially seen at [facility], presented for acute onset double vision and vertigo roughly 2 hours prior to arrival.  Patient had normal exam there, normal CT angio.  Had normal MRI/MRA of the head and neck.  She does endorse that she still has some dizziness but the double vision has improved.  She denies any nausea or vomiting, denies any difficulty swallowing or speaking.  She denies any numbness weakness or tingling in arms or legs, denies any previous strokes.  Does not take any medications.  She denies any recent sick symptoms, no cough sore throat headache ear pain.  She denies any tinnitus or loss of hearing.    Physical Exam   Triage vitals:  T    HR    BP    RR    O2        Physical Exam  Vitals and nursing note reviewed.   Constitutional:       General: She is not in acute distress.     Appearance: Normal appearance. She is not ill-appearing or diaphoretic.   HENT:      Head: Normocephalic and atraumatic.      Mouth/Throat:      Mouth: Mucous membranes are moist.      Pharynx: No oropharyngeal exudate or posterior oropharyngeal erythema.   Eyes:      General: No scleral icterus.     Extraocular Movements: Extraocular movements intact.      Pupils: Pupils are equal, round, and reactive to light.   Cardiovascular:      Rate and Rhythm: Normal rate and regular rhythm.      Pulses: Normal pulses.      Heart sounds: Normal heart sounds. No murmur heard.     No gallop.   Pulmonary:      Effort: Pulmonary effort is normal. No respiratory distress.      Breath  sounds: Normal breath sounds. No stridor. No wheezing, rhonchi or rales.   Abdominal:      General: Bowel sounds are normal. There is no distension.      Palpations: Abdomen is soft. There is no mass.      Tenderness: There is no abdominal tenderness.      Hernia: No hernia is present.   Musculoskeletal:         General: No swelling, deformity or signs of injury. Normal range of motion.      Cervical back: Normal range of motion and neck supple. No tenderness.   Skin:     General: Skin is warm.      Capillary Refill: Capillary refill takes less than 2 seconds.      Findings: No erythema, lesion or rash.   Neurological:      General: No focal deficit present.      Mental Status: She is alert. Mental status is at baseline.   Psychiatric:         Mood and Affect: Mood normal.         Behavior: Behavior normal.          Medical Decision Making & ED Course   Medical Decision Makin y.o. female past medical history of hypertension presents today for blurry vision and dizziness.  Given fact patient symptoms are resolving, my concern for anything such as a stroke or eye pathology is relatively low.  Given the patient was sent here for ophthalmologic evaluation, we will have them evaluate her.  Patient was evaluated by ophthalmology, felt to have normal exam.  They recommended workup for likely TIA given normal imaging.  I did discuss this with the patient, she did ultimately decide that she would rather get these test done on an outpatient basis through her primary care doctor.  I did discuss with her that there is a potential risk for her to have another episode of this if we do not know the cause, but she feels comfortable with this.  Given this, I will send her home on prophylactic 81 mg of aspirin.  I will have her speak with her primary care provider regarding consideration for carotid ultrasound transthoracic echo as well as a ultrasound of the thyroid due to the incidental finding of the thyroid  nodules.  ----      Differential diagnoses considered include but are not limited to: Diplopia, myasthenia gravis, vertigo, occipital stroke/TIA     Social Determinants of Health which Significantly Impact Care: Transportation difficulties The following actions were taken to address these social determinants: Patient get transportation back to [place]    EKG Independent Interpretation: EKG not obtained    Independent Result Review and Interpretation: Relevant laboratory and radiographic results were reviewed and independently interpreted by myself.  As necessary, they are commented on in the ED Course.    Chronic conditions affecting the patient's care: As documented above in [facility]    The patient was discussed with the following consultants/services:  Ophthalmology, who recommended TIA workup. Will defer to PCP per patient's request.    Care Considerations: As documented above in [facility]    ED Course:  Diagnoses as of 03/10/25 0053   TIA (transient ischemic attack)     Disposition   As a result of the work-up, the patient was discharged home.  she was informed of her diagnosis and instructed to come back with any concerns or worsening of condition.  she and was agreeable to the plan as discussed above.  she was given the opportunity to ask questions.  All of the patient's questions were answered.    Procedures   Procedures    Patient seen and discussed with ED attending physician.    Tony Rey MD  Emergency Medicine       Tony Rey MD  Resident  03/10/25 0059

## 2025-03-10 NOTE — CONSULTS
Reason For Consult  Diplopia    History Of Present Illness  Adelia Huddleston is a 84 y.o. female with PMH of HTN, CAD, HLD, and no significant POHx presenting after acute onset diplopia, vertigo and headaches today. Patient was seen at [facility] and transferred to [facility] ED for further evaluation.     Upon arrival to [facility] ED patient reports diplopia has resolved and her headaches are better. Currently denies diplopia in any field of gaze. She now has no eye complaints other than slightly blurry vision OU. Notably she had elevated BP at the [facility] (as high as 183/72) although here most recent BP is 136/55. She reports her diplopia persisted with closing of either eye when tested at the [facility]. No flashes, floaters, curtain, sudden vision loss, eye pain, eye redness. Reports she still feels dizzy especially with ambulation.     Past Medical History  She has no past medical history on file.    Physical Exam  Base Eye Exam       Visual Acuity (Snellen - Linear)         Right Left    Near cc 20/20-2 20/20-2      Correction: Glasses              Tonometry (Tonopen, 11:18 PM)         Right Left    Pressure 16 18              Pupils         Dark Light Shape React APD    Right 3 2 Round Brisk -    Left 3 2 Round Brisk -              Visual Fields (Counting fingers)         Left Right     Full Full              Extraocular Movement         Right Left     Full, Ortho Full, Ortho              Neuro/Psych       Mood/Affect: Normal              Dilation       Both eyes: 1% Mydriacyl & 2.5% Germain  @ 11:19 PM                  Additional Tests       Color         Right Left    Ishihara 11/11 11/11                  Slit Lamp and Fundus Exam       External Exam         Right Left    External Normal Normal              Slit Lamp Exam         Right Left    Lids/Lashes Normal Normal    Conjunctiva/Sclera White and quiet White and quiet    Cornea Clear Clear    Anterior Chamber Deep and quiet Deep and quiet    Iris Round and reactive Round and  "reactive    Lens PCIOL PCIOL with temporal PCO    Anterior Vitreous Normal Normal              Fundus Exam         Right Left    Disc Normal Normal    C/D Ratio 0.2 0.2    Macula Normal Normal    Vessels Normal Normal    Periphery Normal Normal                     Last Recorded Vitals  Blood pressure 136/55, pulse 78, temperature 37.3 °C (99.1 °F), resp. rate 18, height 1.651 m (5' 5\"), weight 61.2 kg (135 lb), SpO2 94%.    Relevant Results  CBC and Auto Differential        Component Value Flag Ref Range Units Status    WBC 8.7      4.4 - 11.3 x10*3/uL Final    nRBC 0.0      0.0 - 0.0 /100 WBCs Final    RBC 4.70      4.00 - 5.20 x10*6/uL Final    Hemoglobin 15.0      12.0 - 16.0 g/dL Final    Hematocrit 43.8      36.0 - 46.0 % Final    MCV 93      80 - 100 fL Final    MCH 31.9      26.0 - 34.0 pg Final    MCHC 34.2      32.0 - 36.0 g/dL Final    RDW 12.1      11.5 - 14.5 % Final    Platelets 209      150 - 450 x10*3/uL Final    Neutrophils % 65.4      40.0 - 80.0 % Final    Immature Granulocytes %, Automated 0.3      0.0 - 0.9 % Final    Comment:    Immature Granulocyte Count (IG) includes promyelocytes, myelocytes and metamyelocytes but does not include bands. Percent differential counts (%) should be interpreted in the context of the absolute cell counts (cells/UL).    Lymphocytes % 26.9      13.0 - 44.0 % Final    Monocytes % 5.5      2.0 - 10.0 % Final    Eosinophils % 1.4      0.0 - 6.0 % Final    Basophils % 0.5      0.0 - 2.0 % Final    Neutrophils Absolute 5.68      1.60 - 5.50 x10*3/uL Final    Comment:    Percent differential counts (%) should be interpreted in the context of the absolute cell counts (cells/uL).    Immature Granulocytes Absolute, Automated 0.03      0.00 - 0.50 x10*3/uL Final    Lymphocytes Absolute 2.34      0.80 - 3.00 x10*3/uL Final    Monocytes Absolute 0.48      0.05 - 0.80 x10*3/uL Final    Eosinophils Absolute 0.12      0.00 - 0.40 x10*3/uL Final    Basophils Absolute 0.04      0.00 " - 0.10 x10*3/uL Final                  Comprehensive metabolic panel        Component Value Flag Ref Range Units Status    Glucose 162      74 - 99 mg/dL Final    Sodium 141      136 - 145 mmol/L Final    Potassium 3.2      3.5 - 5.3 mmol/L Final    Chloride 104      98 - 107 mmol/L Final    Bicarbonate 27      21 - 32 mmol/L Final    Anion Gap 13      10 - 20 mmol/L Final    Urea Nitrogen 22      6 - 23 mg/dL Final    Creatinine 0.95      0.50 - 1.05 mg/dL Final    eGFR 58      >60 mL/min/1.73m*2 Final    Comment:    Calculations of estimated GFR are performed using the 2021 CKD-EPI Study Refit equation without the race variable for the IDMS-Traceable creatinine methods.  https://jasn.asnjournals.org/content/early/2021/09/22/ASN.5934903862    Calcium 9.7      8.6 - 10.3 mg/dL Final    Albumin 4.3      3.4 - 5.0 g/dL Final    Alkaline Phosphatase 43      33 - 136 U/L Final    Total Protein 7.1      6.4 - 8.2 g/dL Final    AST 19      9 - 39 U/L Final    Bilirubin, Total 0.6      0.0 - 1.2 mg/dL Final    ALT 14      7 - 45 U/L Final    Comment:    Patients treated with Sulfasalazine may generate falsely decreased results for ALT.                  CT head wo IV contrast          Interpreted By:  Bruce Mallory,   STUDY:  CT HEAD WO IV CONTRAST;  3/9/2025 3:06 pm      INDICATION:  Signs/Symptoms:double vision, vertigo.          COMPARISON:  None.      ACCESSION NUMBER(S):  [id number]      ORDERING CLINICIAN:  SELAM LOPEZ      TECHNIQUE:  Unenhanced images were obtained through the brain.      FINDINGS:  There is atrophy resulting in prominence of the ventricles and sulci.  There are areas of decreased attenuation within the white matter  which are nonspecific but are commonly associated with small vessel  ischemic disease. There is no mass effect or midline shift. No acute  intracranial hemorrhage is identified. No extra-axial fluid  collections are seen. No intraparenchymal mass lesions are  identified.  Bone windows  demonstrate no evidence of an acute  calvarial fracture. There is mucosal thickening within the left  maxillary, ethmoid, and frontal sinuses.      IMPRESSION:  No evidence of an acute intracranial process.      MACRO:  None.      Signed by: Bruce Mallory 3/9/2025 3:33 PM  Dictation workstation:   [id number]         Troponin I, High Sensitivity, Initial        Component Value Flag Ref Range Units Status    Troponin I, High Sensitivity 5      0 - 13 ng/L Final                  Troponin, High Sensitivity, 1 Hour        Component Value Flag Ref Range Units Status    Troponin I, High Sensitivity 7      0 - 13 ng/L Final                  MR brain wo IV contrast          Interpreted By:  Radha Mcarthur,   STUDY:  MR BRAIN WO IV CONTRAST; MR ANGIO NECK WO IV CONTRAST; MR ANGIO HEAD  WO IV CONTRAST;  3/9/2025 6:07 pm; 3/9/2025 6:09 pm      INDICATION:  Signs/Symptoms:Binocular vision loss- concern for stroke.  Stroke  protocol.          COMPARISON:  Head CT March 9, 2025      ACCESSION NUMBER(S):  [id number]; [id number]; [id number]      ORDERING CLINICIAN:  SELAM LOPEZ      TECHNIQUE:  Axial T2, FLAIR, DWI, gradient echo T2 and  sagittal and coronal T1  weighted images of brain were acquired.      Time-of-flight MRA of the head  and neck was performed. The images  were reviewed as source images and maximum intensity projections.      FINDINGS:  Brain:      CSF Spaces: The ventricles, sulci and basal cisterns are within  normal limits.      Parenchyma: There is no diffusion restriction abnormality to suggest  acute infarct.  There are patchy and confluent hyperintensities on  FLAIR and T2 weighted imaging in the subcortical and periventricular  white matter. There is involvement of the hank. Prominent  perivascular spaces and/or remote lacunar infarcts are noted in the  basal ganglia, thalami, and external capsules. There is a punctate  focus of susceptibility artifact on gradient echo T2 weighted imaging  in the  anterior left cerebellum which could be related to remote  microhemorrhage or mineralization. There is no mass effect or midline  shift.      Paranasal Sinuses and Mastoids: There is near-complete opacification  of the left frontal sinus and partial to complete opacification of  several ethmoid air cells, left greater than right. There are  retention cysts or polyps and mucosal thickening in the left  maxillary sinus. There is also a small amount of fluid. There is  opacification of scattered mastoid air cells.      MRA of head: The images are degraded by patient motion.      Anterior circulation:    There is irregularity and attenuation of  flow related signal along the intracranial segments of the internal  carotid arteries bilaterally likely due largely to motion  degradation. Although this limits evaluation for presence of stenosis  or small aneurysms. The right SHANNAN is dominant and the left is  diminutive in caliber likely reflecting developmental variation.  There is a short segment of potential narrowing of the distal M1  segment of the left MCA favored to reflect motion degradation. The  proximal right MCA and SHANNAN appear patent.      Posterior circulation:    Bilateral intracranial vertebral arteries,  vertebrobasilar junction, basilar artery and proximal posterior  cerebral arteries demonstrate expected flow signal.      MRA of neck:      The source images are degraded by artifact and patient motion.      Carotid vessels:      The included portions of the common carotid arteries appear patent.  The left is tortuous. There is irregularity and attenuation of flow  related signal at the carotid bifurcations and origins of the ICAs,  left greater than right likely due to a combination of artifact and  atherosclerotic disease. There is no measurable stenosis on the  right. There is narrowing on the left measuring at most 30% relative  to the caliber of the vessel more distally.      Vertebral vessels:      The  right vertebral artery is slightly dominant. The V1 segments of  the vertebral arteries are not adequately evaluated. Otherwise, the  visualized cervical segments of the vertebral arteries appear to be  patent.      The source images demonstrate a mass in the left neck demonstrating  bright signal and measuring approximately 3 x 2.6 cm.      IMPRESSION:  MRI Brain:      1. Nonspecific white matter changes most likely represent  small-vessel ischemic disease in a patient of this age and also  involve the hank. No evidence for acute ischemic injury.  2. Inflammatory changes of the paranasal sinuses. There is a small  amount of fluid in the left maxillary sinus which could reflect acute  sinusitis in the appropriate clinical setting.          MRA:      1. Limited, motion degraded MRA of the head without proximal large  branch vessel cutoff. Short segment of narrowing of the distal M1  segment of the left MCA is not excluded although this is favored to  be due to motion degradation.  2. Sclerotic changes of the left carotid bifurcation and proximal  left ICA with mild narrowing.  3. Mass in the left neck on source images from MRA of the neck is  inadequately evaluated on the basis of this examination but thought  to most likely be arising from the thyroid gland.      MACRO:  Incidental Finding:  There are few small hypoattenuating nodules  measuring equal to or greater than 1.5 cm in the thyroid gland.  (**-YCF-**)      Instructions:  Further evaluation with nonemergent thyroid ultrasound.  (Managing Incidental Thyroid Nodules Detected on Imaging: White Paper  of the ACR Incidental Thyroid Findings Committee. Kelley Ya. et  al. Journal of the American College of Radiology,Volume 12, Issue 2,  143 - 150.) [email or web address]      Signed by: Radha Mcarthur 3/9/2025 6:27 PM  Dictation workstation:   [id number]         Magnesium        Component Value Flag Ref Range Units Status    Magnesium 2.08      1.60 - 2.40 mg/dL  Final                  MR angio head wo IV contrast          Interpreted By:  Radha Mcarthur,   STUDY:  MR BRAIN WO IV CONTRAST; MR ANGIO NECK WO IV CONTRAST; MR ANGIO HEAD  WO IV CONTRAST;  3/9/2025 6:07 pm; 3/9/2025 6:09 pm      INDICATION:  Signs/Symptoms:Binocular vision loss- concern for stroke.  Stroke  protocol.          COMPARISON:  Head CT March 9, 2025      ACCESSION NUMBER(S):  [id number]; [id number]; [id number]      ORDERING CLINICIAN:  SELAM LOPEZ      TECHNIQUE:  Axial T2, FLAIR, DWI, gradient echo T2 and  sagittal and coronal T1  weighted images of brain were acquired.      Time-of-flight MRA of the head  and neck was performed. The images  were reviewed as source images and maximum intensity projections.      FINDINGS:  Brain:      CSF Spaces: The ventricles, sulci and basal cisterns are within  normal limits.      Parenchyma: There is no diffusion restriction abnormality to suggest  acute infarct.  There are patchy and confluent hyperintensities on  FLAIR and T2 weighted imaging in the subcortical and periventricular  white matter. There is involvement of the hank. Prominent  perivascular spaces and/or remote lacunar infarcts are noted in the  basal ganglia, thalami, and external capsules. There is a punctate  focus of susceptibility artifact on gradient echo T2 weighted imaging  in the anterior left cerebellum which could be related to remote  microhemorrhage or mineralization. There is no mass effect or midline  shift.      Paranasal Sinuses and Mastoids: There is near-complete opacification  of the left frontal sinus and partial to complete opacification of  several ethmoid air cells, left greater than right. There are  retention cysts or polyps and mucosal thickening in the left  maxillary sinus. There is also a small amount of fluid. There is  opacification of scattered mastoid air cells.      MRA of head: The images are degraded by patient motion.      Anterior circulation:    There  is irregularity and attenuation of  flow related signal along the intracranial segments of the internal  carotid arteries bilaterally likely due largely to motion  degradation. Although this limits evaluation for presence of stenosis  or small aneurysms. The right SHANNAN is dominant and the left is  diminutive in caliber likely reflecting developmental variation.  There is a short segment of potential narrowing of the distal M1  segment of the left MCA favored to reflect motion degradation. The  proximal right MCA and SHANNAN appear patent.      Posterior circulation:    Bilateral intracranial vertebral arteries,  vertebrobasilar junction, basilar artery and proximal posterior  cerebral arteries demonstrate expected flow signal.      MRA of neck:      The source images are degraded by artifact and patient motion.      Carotid vessels:      The included portions of the common carotid arteries appear patent.  The left is tortuous. There is irregularity and attenuation of flow  related signal at the carotid bifurcations and origins of the ICAs,  left greater than right likely due to a combination of artifact and  atherosclerotic disease. There is no measurable stenosis on the  right. There is narrowing on the left measuring at most 30% relative  to the caliber of the vessel more distally.      Vertebral vessels:      The right vertebral artery is slightly dominant. The V1 segments of  the vertebral arteries are not adequately evaluated. Otherwise, the  visualized cervical segments of the vertebral arteries appear to be  patent.      The source images demonstrate a mass in the left neck demonstrating  bright signal and measuring approximately 3 x 2.6 cm.      IMPRESSION:  MRI Brain:      1. Nonspecific white matter changes most likely represent  small-vessel ischemic disease in a patient of this age and also  involve the hank. No evidence for acute ischemic injury.  2. Inflammatory changes of the paranasal sinuses. There is a  small  amount of fluid in the left maxillary sinus which could reflect acute  sinusitis in the appropriate clinical setting.          MRA:      1. Limited, motion degraded MRA of the head without proximal large  branch vessel cutoff. Short segment of narrowing of the distal M1  segment of the left MCA is not excluded although this is favored to  be due to motion degradation.  2. Sclerotic changes of the left carotid bifurcation and proximal  left ICA with mild narrowing.  3. Mass in the left neck on source images from MRA of the neck is  inadequately evaluated on the basis of this examination but thought  to most likely be arising from the thyroid gland.      MACRO:  Incidental Finding:  There are few small hypoattenuating nodules  measuring equal to or greater than 1.5 cm in the thyroid gland.  (**-YCF-**)      Instructions:  Further evaluation with nonemergent thyroid ultrasound.  (Managing Incidental Thyroid Nodules Detected on Imaging: White Paper  of the ACR Incidental Thyroid Findings Committee. Kelley Ya et  al. Journal of the American College of Radiology,Volume 12, Issue 2,  143 - 150.) [email or web address]      Signed by: Radha Mcatrhur 3/9/2025 6:27 PM  Dictation workstation:   [id number]         MR angio neck wo IV contrast          Interpreted By:  Radha Mcarthur,   STUDY:  MR BRAIN WO IV CONTRAST; MR ANGIO NECK WO IV CONTRAST; MR ANGIO HEAD  WO IV CONTRAST;  3/9/2025 6:07 pm; 3/9/2025 6:09 pm      INDICATION:  Signs/Symptoms:Binocular vision loss- concern for stroke.  Stroke  protocol.          COMPARISON:  Head CT March 9, 2025      ACCESSION NUMBER(S):  [id number]; [id number]; [id number]      ORDERING CLINICIAN:  SELAM LOPEZ      TECHNIQUE:  Axial T2, FLAIR, DWI, gradient echo T2 and  sagittal and coronal T1  weighted images of brain were acquired.      Time-of-flight MRA of the head  and neck was performed. The images  were reviewed as source images and maximum intensity  projections.      FINDINGS:  Brain:      CSF Spaces: The ventricles, sulci and basal cisterns are within  normal limits.      Parenchyma: There is no diffusion restriction abnormality to suggest  acute infarct.  There are patchy and confluent hyperintensities on  FLAIR and T2 weighted imaging in the subcortical and periventricular  white matter. There is involvement of the hank. Prominent  perivascular spaces and/or remote lacunar infarcts are noted in the  basal ganglia, thalami, and external capsules. There is a punctate  focus of susceptibility artifact on gradient echo T2 weighted imaging  in the anterior left cerebellum which could be related to remote  microhemorrhage or mineralization. There is no mass effect or midline  shift.      Paranasal Sinuses and Mastoids: There is near-complete opacification  of the left frontal sinus and partial to complete opacification of  several ethmoid air cells, left greater than right. There are  retention cysts or polyps and mucosal thickening in the left  maxillary sinus. There is also a small amount of fluid. There is  opacification of scattered mastoid air cells.      MRA of head: The images are degraded by patient motion.      Anterior circulation:    There is irregularity and attenuation of  flow related signal along the intracranial segments of the internal  carotid arteries bilaterally likely due largely to motion  degradation. Although this limits evaluation for presence of stenosis  or small aneurysms. The right SHANNAN is dominant and the left is  diminutive in caliber likely reflecting developmental variation.  There is a short segment of potential narrowing of the distal M1  segment of the left MCA favored to reflect motion degradation. The  proximal right MCA and SHANNAN appear patent.      Posterior circulation:    Bilateral intracranial vertebral arteries,  vertebrobasilar junction, basilar artery and proximal posterior  cerebral arteries demonstrate expected flow  signal.      MRA of neck:      The source images are degraded by artifact and patient motion.      Carotid vessels:      The included portions of the common carotid arteries appear patent.  The left is tortuous. There is irregularity and attenuation of flow  related signal at the carotid bifurcations and origins of the ICAs,  left greater than right likely due to a combination of artifact and  atherosclerotic disease. There is no measurable stenosis on the  right. There is narrowing on the left measuring at most 30% relative  to the caliber of the vessel more distally.      Vertebral vessels:      The right vertebral artery is slightly dominant. The V1 segments of  the vertebral arteries are not adequately evaluated. Otherwise, the  visualized cervical segments of the vertebral arteries appear to be  patent.      The source images demonstrate a mass in the left neck demonstrating  bright signal and measuring approximately 3 x 2.6 cm.      IMPRESSION:  MRI Brain:      1. Nonspecific white matter changes most likely represent  small-vessel ischemic disease in a patient of this age and also  involve the hank. No evidence for acute ischemic injury.  2. Inflammatory changes of the paranasal sinuses. There is a small  amount of fluid in the left maxillary sinus which could reflect acute  sinusitis in the appropriate clinical setting.          MRA:      1. Limited, motion degraded MRA of the head without proximal large  branch vessel cutoff. Short segment of narrowing of the distal M1  segment of the left MCA is not excluded although this is favored to  be due to motion degradation.  2. Sclerotic changes of the left carotid bifurcation and proximal  left ICA with mild narrowing.  3. Mass in the left neck on source images from MRA of the neck is  inadequately evaluated on the basis of this examination but thought  to most likely be arising from the thyroid gland.      MACRO:  Incidental Finding:  There are few small  hypoattenuating nodules  measuring equal to or greater than 1.5 cm in the thyroid gland.  (**-YCF-**)      Instructions:  Further evaluation with nonemergent thyroid ultrasound.  (Managing Incidental Thyroid Nodules Detected on Imaging: White Paper  of the ACR Incidental Thyroid Findings Committee. Kelley Ya et  al. Journal of the American College of Radiology,Volume 12, Issue 2,  143 - 150.) [email or web address]      Signed by: Radha Mcarthur 3/9/2025 6:27 PM  Dictation workstation:   [id number]          MRI brain without contrast personally reviewed, overall, globes and EOMs wnl OU. Small vessel ischemic changes of cerebral white matter and hank.      Assessment/Plan     Overall Ms. Huddleston is an 85yo female with vascular risk factors and episode today of HTN emergency presented with transient diplopia (patient describes as bilateral monocular although I think was probably binocular) which has resolved, as well as acute headache and vertigo. Patient's diplopia has fully resolved over the course of transfer to [facility] ED and with improvement in BP. She may have had transient diplopia in setting of HTN emergency which has resolved (BP now 136/55). CN palsy seems less likely given full motility and no diplopia. Patient does have small vessel ischemic disease of hank which could cause multiple cranial neuropathies resulting in diplopia, however if this occurred at all it seemed to have occurred transiently.  Recommend remainder of stroke workup per ED as her transient diplopia likely represents a TIA.       #Transient diplopia, likely binocular  #TIA  - Now resolved after treating HTN emergency. Patient does not appear to have had acute stroke per non-contrast CT and MRI. Her episode likely represents a TIA and warrants stroke workup.   - Eye exam is normal, motility is full and smooth OU with no diplopia in any gaze. No disc edema OU.   - Patient has regular eye-care provider she wishes to continue to follow and  will setup appointment with on outpatient basis.  - Recommend blood pressure control and remainder of stroke workup per ED.     Patient discussed with Dr. Mckeon neuro-ophthalmologist.     Salbador Chavez MD  PGY2 - Ophthalmology     Ophthalmology Adult Pager - [phone]  Ophthalmology Pediatrics Pager - [phone]    For adult follow-up appointments, call: [phone]  For pediatric follow-up appointments, call: [phone]    NOTE: This note is not finalized until attending reviews and signs.

## 2025-03-14 ENCOUNTER — OFFICE VISIT (OUTPATIENT)
Dept: PRIMARY CARE | Facility: CLINIC | Age: 85
End: 2025-03-14
Payer: MEDICARE

## 2025-03-14 ENCOUNTER — TELEPHONE (OUTPATIENT)
Dept: PRIMARY CARE | Facility: CLINIC | Age: 85
End: 2025-03-14

## 2025-03-14 VITALS
HEART RATE: 62 BPM | BODY MASS INDEX: 24.48 KG/M2 | WEIGHT: 133 LBS | DIASTOLIC BLOOD PRESSURE: 79 MMHG | OXYGEN SATURATION: 94 % | SYSTOLIC BLOOD PRESSURE: 206 MMHG | HEIGHT: 62 IN

## 2025-03-14 DIAGNOSIS — H53.2 DIPLOPIA: ICD-10-CM

## 2025-03-14 DIAGNOSIS — G45.9 TRANSIENT ISCHEMIC ATTACK: ICD-10-CM

## 2025-03-14 DIAGNOSIS — I10 BENIGN ESSENTIAL HYPERTENSION: Primary | ICD-10-CM

## 2025-03-14 PROCEDURE — 1036F TOBACCO NON-USER: CPT

## 2025-03-14 PROCEDURE — 3078F DIAST BP <80 MM HG: CPT

## 2025-03-14 PROCEDURE — 99214 OFFICE O/P EST MOD 30 MIN: CPT

## 2025-03-14 PROCEDURE — 1160F RVW MEDS BY RX/DR IN RCRD: CPT

## 2025-03-14 PROCEDURE — G2211 COMPLEX E/M VISIT ADD ON: HCPCS

## 2025-03-14 PROCEDURE — 1159F MED LIST DOCD IN RCRD: CPT

## 2025-03-14 PROCEDURE — 3077F SYST BP >= 140 MM HG: CPT

## 2025-03-14 RX ORDER — LISINOPRIL AND HYDROCHLOROTHIAZIDE 12.5; 2 MG/1; MG/1
1 TABLET ORAL DAILY
Qty: 30 TABLET | Refills: 1 | Status: SHIPPED | OUTPATIENT
Start: 2025-03-14 | End: 2025-03-19 | Stop reason: ALTCHOICE

## 2025-03-14 RX ORDER — CLOPIDOGREL BISULFATE 75 MG/1
75 TABLET ORAL DAILY
Qty: 21 TABLET | Refills: 0 | Status: SHIPPED | OUTPATIENT
Start: 2025-03-14 | End: 2025-04-04

## 2025-03-14 RX ORDER — CLOPIDOGREL BISULFATE 75 MG/1
75 TABLET ORAL DAILY
Qty: 30 TABLET | Refills: 0 | Status: SHIPPED | OUTPATIENT
Start: 2025-03-14 | End: 2025-03-14

## 2025-03-14 NOTE — PROGRESS NOTES
Subjective   Patient ID: Adelia Huddleston is a 84 y.o. female who presents for Hospital Follow-up (Had a mini stroke on Sunday).  HPI    Took blood pressure medication this morning   Taking baby aspirin daily     [facility] felt like eyes started to get double vision, she was dizzy 1230pm, lasted until 2pmn the following day   Feels back to normal but she is pretty weak  Denies off balanced, no dizzy    The vision is gone   No headache   No chest pain, no SOB     Did not go away until     Date:    MRI:  Vessel imaging:  Echo:  Cardiac monitoring:   Antiplatelet:          ; Denies side effects including excessive bleeding and bruising.   Anticoagulation:   ; Denies side effects including excessive bleeding and bruising.   Statin:    ; Denies side effects  Risk factors:    Since then, pt. has been ...    Therapy/Rehab:  Mood:  Sleep:  Diet:  Exercise:  Work:  Driving:  Tobacco/alcohol/illicit drug use:    ABCD score    Past Surgical History:   Procedure Laterality Date    CATARACT EXTRACTION  07/05/2013    Cataract Surgery      History reviewed. No pertinent past medical history.  Social History     Tobacco Use    Smoking status: Never    Smokeless tobacco: Never   Substance Use Topics    Alcohol use: Never    Drug use: Never        Review of Systems  10 point review of systems performed and is negative except as noted in the HPI.      Current Outpatient Medications:     aspirin 81 mg EC tablet, Take 1 tablet (81 mg) by mouth once daily., Disp: 30 tablet, Rfl: 11    cholecalciferol (Vitamin D-3) 50 mcg (2,000 unit) capsule, Take by mouth., Disp: , Rfl:     ciclopirox (Loprox) 0.77 % suspension, Apply 1 Application topically 2 times a day., Disp: 30 mL, Rfl: 0    fish oil concentrate (Omega-3) 120-180 mg capsule, Take 1 capsule (1 g) by mouth 2 times a day., Disp: , Rfl:     lisinopriL-hydrochlorothiazide 10-12.5 mg tablet, Take 1 tablet by mouth once daily., Disp: 90 tablet, Rfl: 3    red yeast rice 600  "mg capsule, Take by mouth once daily., Disp: , Rfl:     rosuvastatin (Crestor) 10 mg tablet, Take 1 tablet (10 mg) by mouth once daily at bedtime., Disp: 90 tablet, Rfl: 3     Objective   BP (!) 206/79   Pulse 62   Ht 1.575 m (5' 2\")   Wt 60.3 kg (133 lb)   SpO2 94%   BMI 24.33 kg/m²     Physical Exam    Assessment & Plan  Benign essential hypertension    Orders:    lisinopriL-hydrochlorothiazide 20-12.5 mg tablet; Take 1 tablet by mouth once daily.    Transient ischemic attack    Orders:    US thyroid; Future    Transthoracic Echo (TTE) Complete; Future    Vascular US Carotid Artery Duplex Bilateral; Future    clopidogrel (Plavix) 75 mg tablet; Take 1 tablet (75 mg) by mouth once daily.    Diplopia    Orders:    Vascular US Carotid Artery Duplex Bilateral; Future          Discussed at visit any disease processes that were of concern as well as the risks, benefits and instructions on any new medication provided. Patient (and/or caretaker of patient if present) stated all questions were answered, and they voiced understanding of instructions.      Masha Khalil PA-C  " BP here in the office, recommend increasing lisinopril hydrochlorothiazide to 20-12.5mg   Continue to check BP - write them down, bring to follow up   Follow up in 3 weeks  Also get a new cuff, states her cuff at home is very old, thinks it is not reading accurately   Also recommend a BP cuff check visit when she gets the new cuff        Transient ischemic attack  Imaging:   MRI Brain showed - 1. Nonspecific white matter changes most likely represent small-vessel ischemic disease in a patient of this age and also involve the hank. No evidence for acute ischemic injury. 2. Inflammatory changes of the paranasal sinuses. There is a small amount of fluid in the left maxillary sinus which could reflect acute sinusitis in the appropriate clinical setting.  MRA - 1. Limited, motion degraded MRA of the head without proximal large branch vessel cutoff. Short segment of narrowing of the distal M1 segment of the left MCA is not excluded although this is favored to be due to motion degradation. 2. Sclerotic changes of the left carotid bifurcation and proximal left ICA with mild narrowing. 3. Mass in the left neck on source images from MRA of the neck is inadequately evaluated on the basis of this examination but thought to most likely be arising from the thyroid gland.  ABCD score of 4 points - moderate stroke risk, for this continue ASA 81mg but recommend she also start plavix for 21 days  ECG done in the ER 3/9/25 - sinus rhythm, consider holter   She still needs echo, carotid US, thyroid US - will schedule for her   Case discussed with Briana Claros PA-C with previous Neurology experience   Orders:    US thyroid; Future    Transthoracic Echo (TTE) Complete; Future    Vascular US Carotid Artery Duplex Bilateral; Future    clopidogrel (Plavix) 75 mg tablet; Take 1 tablet (75 mg) by mouth once daily for 21 days. (Patient not taking: Reported on 3/19/2025)    Diplopia  Need for carotid artery US   Orders:    Vascular US Carotid  Artery Duplex Bilateral; Future          Discussed at visit any disease processes that were of concern as well as the risks, benefits and instructions on any new medication provided. Patient (and/or caretaker of patient if present) stated all questions were answered, and they voiced understanding of instructions.      Masha Khalil PA-C

## 2025-03-14 NOTE — TELEPHONE ENCOUNTER
Daughter called and said they got a new bp machine and it is reading that her bp is normal and  I worried about starting her on the new meds and he would like you to call him at [phone]

## 2025-03-14 NOTE — PATIENT INSTRUCTIONS
Increase blood pressure medication- start higher dose I am sending in - lisinopril hydrochlorothiazide 20-12.5mg  Get a new blood pressure machine   Continue aspirin   Start plavix     We will schedule the ultrasound of thyroid, ultrasound of carotid, ultrasound of heart (echo) at [facility] - we will call with appointment time

## 2025-03-16 ENCOUNTER — APPOINTMENT (OUTPATIENT)
Dept: RADIOLOGY | Facility: HOSPITAL | Age: 85
End: 2025-03-16
Payer: MEDICARE

## 2025-03-16 ENCOUNTER — HOSPITAL ENCOUNTER (EMERGENCY)
Facility: HOSPITAL | Age: 85
Discharge: HOME | End: 2025-03-16
Attending: STUDENT IN AN ORGANIZED HEALTH CARE EDUCATION/TRAINING PROGRAM
Payer: MEDICARE

## 2025-03-16 VITALS
HEIGHT: 62 IN | TEMPERATURE: 99 F | WEIGHT: 135 LBS | DIASTOLIC BLOOD PRESSURE: 73 MMHG | RESPIRATION RATE: 16 BRPM | SYSTOLIC BLOOD PRESSURE: 178 MMHG | BODY MASS INDEX: 24.84 KG/M2 | OXYGEN SATURATION: 98 % | HEART RATE: 68 BPM

## 2025-03-16 DIAGNOSIS — E04.1 THYROID CYST: Primary | ICD-10-CM

## 2025-03-16 LAB
ALBUMIN SERPL BCP-MCNC: 4.2 G/DL (ref 3.4–5)
ALP SERPL-CCNC: 40 U/L (ref 33–136)
ALT SERPL W P-5'-P-CCNC: 13 U/L (ref 7–45)
ANION GAP SERPL CALC-SCNC: 11 MMOL/L (ref 10–20)
AST SERPL W P-5'-P-CCNC: 19 U/L (ref 9–39)
BASOPHILS # BLD AUTO: 0.04 X10*3/UL (ref 0–0.1)
BASOPHILS NFR BLD AUTO: 0.4 %
BILIRUB SERPL-MCNC: 0.7 MG/DL (ref 0–1.2)
BUN SERPL-MCNC: 24 MG/DL (ref 6–23)
CALCIUM SERPL-MCNC: 9.5 MG/DL (ref 8.6–10.3)
CHLORIDE SERPL-SCNC: 101 MMOL/L (ref 98–107)
CO2 SERPL-SCNC: 30 MMOL/L (ref 21–32)
CREAT SERPL-MCNC: 1.02 MG/DL (ref 0.5–1.05)
EGFRCR SERPLBLD CKD-EPI 2021: 54 ML/MIN/1.73M*2
EOSINOPHIL # BLD AUTO: 0.12 X10*3/UL (ref 0–0.4)
EOSINOPHIL NFR BLD AUTO: 1.3 %
ERYTHROCYTE [DISTWIDTH] IN BLOOD BY AUTOMATED COUNT: 12.3 % (ref 11.5–14.5)
GLUCOSE SERPL-MCNC: 98 MG/DL (ref 74–99)
HCT VFR BLD AUTO: 44.8 % (ref 36–46)
HGB BLD-MCNC: 15 G/DL (ref 12–16)
IMM GRANULOCYTES # BLD AUTO: 0.02 X10*3/UL (ref 0–0.5)
IMM GRANULOCYTES NFR BLD AUTO: 0.2 % (ref 0–0.9)
LYMPHOCYTES # BLD AUTO: 2.04 X10*3/UL (ref 0.8–3)
LYMPHOCYTES NFR BLD AUTO: 22.7 %
MCH RBC QN AUTO: 32.1 PG (ref 26–34)
MCHC RBC AUTO-ENTMCNC: 33.5 G/DL (ref 32–36)
MCV RBC AUTO: 96 FL (ref 80–100)
MONOCYTES # BLD AUTO: 0.58 X10*3/UL (ref 0.05–0.8)
MONOCYTES NFR BLD AUTO: 6.4 %
NEUTROPHILS # BLD AUTO: 6.2 X10*3/UL (ref 1.6–5.5)
NEUTROPHILS NFR BLD AUTO: 69 %
NRBC BLD-RTO: 0 /100 WBCS (ref 0–0)
PLATELET # BLD AUTO: 163 X10*3/UL (ref 150–450)
POTASSIUM SERPL-SCNC: 3.8 MMOL/L (ref 3.5–5.3)
PROT SERPL-MCNC: 6.9 G/DL (ref 6.4–8.2)
RBC # BLD AUTO: 4.67 X10*6/UL (ref 4–5.2)
SODIUM SERPL-SCNC: 138 MMOL/L (ref 136–145)
WBC # BLD AUTO: 9 X10*3/UL (ref 4.4–11.3)

## 2025-03-16 PROCEDURE — 80053 COMPREHEN METABOLIC PANEL: CPT | Performed by: STUDENT IN AN ORGANIZED HEALTH CARE EDUCATION/TRAINING PROGRAM

## 2025-03-16 PROCEDURE — 85025 COMPLETE CBC W/AUTO DIFF WBC: CPT | Performed by: STUDENT IN AN ORGANIZED HEALTH CARE EDUCATION/TRAINING PROGRAM

## 2025-03-16 PROCEDURE — 84443 ASSAY THYROID STIM HORMONE: CPT

## 2025-03-16 PROCEDURE — 70491 CT SOFT TISSUE NECK W/DYE: CPT

## 2025-03-16 PROCEDURE — 2550000001 HC RX 255 CONTRASTS: Performed by: STUDENT IN AN ORGANIZED HEALTH CARE EDUCATION/TRAINING PROGRAM

## 2025-03-16 PROCEDURE — 96374 THER/PROPH/DIAG INJ IV PUSH: CPT | Mod: 59

## 2025-03-16 PROCEDURE — 2500000004 HC RX 250 GENERAL PHARMACY W/ HCPCS (ALT 636 FOR OP/ED): Performed by: STUDENT IN AN ORGANIZED HEALTH CARE EDUCATION/TRAINING PROGRAM

## 2025-03-16 PROCEDURE — 70491 CT SOFT TISSUE NECK W/DYE: CPT | Performed by: STUDENT IN AN ORGANIZED HEALTH CARE EDUCATION/TRAINING PROGRAM

## 2025-03-16 PROCEDURE — 36415 COLL VENOUS BLD VENIPUNCTURE: CPT | Performed by: STUDENT IN AN ORGANIZED HEALTH CARE EDUCATION/TRAINING PROGRAM

## 2025-03-16 PROCEDURE — 93971 EXTREMITY STUDY: CPT

## 2025-03-16 PROCEDURE — 99285 EMERGENCY DEPT VISIT HI MDM: CPT | Mod: 25 | Performed by: STUDENT IN AN ORGANIZED HEALTH CARE EDUCATION/TRAINING PROGRAM

## 2025-03-16 PROCEDURE — 93971 EXTREMITY STUDY: CPT | Performed by: RADIOLOGY

## 2025-03-16 RX ORDER — KETOROLAC TROMETHAMINE 15 MG/ML
15 INJECTION, SOLUTION INTRAMUSCULAR; INTRAVENOUS ONCE
Status: COMPLETED | OUTPATIENT
Start: 2025-03-16 | End: 2025-03-16

## 2025-03-16 RX ADMIN — KETOROLAC TROMETHAMINE 15 MG: 15 INJECTION, SOLUTION INTRAMUSCULAR; INTRAVENOUS at 13:25

## 2025-03-16 RX ADMIN — IOHEXOL 75 ML: 350 INJECTION, SOLUTION INTRAVENOUS at 13:53

## 2025-03-16 ASSESSMENT — PAIN DESCRIPTION - LOCATION
LOCATION: NECK
LOCATION: NECK

## 2025-03-16 ASSESSMENT — PAIN SCALES - GENERAL
PAINLEVEL_OUTOF10: 3
PAINLEVEL_OUTOF10: 4
PAINLEVEL_OUTOF10: 0 - NO PAIN
PAINLEVEL_OUTOF10: 5 - MODERATE PAIN

## 2025-03-16 ASSESSMENT — PAIN DESCRIPTION - ORIENTATION
ORIENTATION: LEFT
ORIENTATION: LEFT

## 2025-03-16 ASSESSMENT — COLUMBIA-SUICIDE SEVERITY RATING SCALE - C-SSRS
2. HAVE YOU ACTUALLY HAD ANY THOUGHTS OF KILLING YOURSELF?: NO
1. IN THE PAST MONTH, HAVE YOU WISHED YOU WERE DEAD OR WISHED YOU COULD GO TO SLEEP AND NOT WAKE UP?: NO
6. HAVE YOU EVER DONE ANYTHING, STARTED TO DO ANYTHING, OR PREPARED TO DO ANYTHING TO END YOUR LIFE?: NO

## 2025-03-16 ASSESSMENT — PAIN DESCRIPTION - PAIN TYPE: TYPE: ACUTE PAIN

## 2025-03-16 ASSESSMENT — PAIN - FUNCTIONAL ASSESSMENT: PAIN_FUNCTIONAL_ASSESSMENT: 0-10

## 2025-03-16 NOTE — DISCHARGE INSTRUCTIONS
Hold Plavix until you see ENT.  Please follow-up with ENT tomorrow.    Return if you have difficulty swallowing or difficulty breathing.

## 2025-03-16 NOTE — ED TRIAGE NOTES
Pt to ED for lump in the left side of the neck that started Friday. She went to PCP, was given plavix as she had a TIA last week. She took one plavix Friday and one Saturday and now she noticed the lump. She is concerned. She did not take the plavix today. She stated the lump hurts more when she moves. No other symptoms.

## 2025-03-16 NOTE — ED PROVIDER NOTES
CC: Neck Pain (Pt to ED for lump in the left side of the neck that started Friday. She went to PCP, was given plavix as she had a TIA last week. She took one plavix Friday and one Saturday and now she noticed the lump. She is concerned. She did not take the plavix today. She stated the lump hurts more when she moves. No other symptoms.)     HPI:  Patient is an 84-year-old female who was recently seen and worked up for TIA presenting the emergency department for a lump in her left neck.  She went to her primary care doctor on Friday who wrote her for Plavix.  She took 1 pill of Plavix and then noticed her throat started to become sore.  By Saturday she states that she had this big lump in the left side of her neck.  She states it is tender to the touch.  She denies fever.  Denies difficulty breathing.  She denies difficulty swallowing or speaking.  She states the lump hurts worse when she moves her neck.    Records Reviewed:  Recent available ED and inpatient notes reviewed in EMR.    PMHx/PSHx:  Per HPI.   - has no past medical history on file.  - has a past surgical history that includes Cataract extraction (07/05/2013).  - has Asymptomatic PVCs; Benign essential hypertension; Benign familial tremor; Carotid artery stenosis; Chronic pain of left knee; Hypercholesterolemia; Joint pain, hip; Myalgia; Chondrocalcinosis of left knee; and Chondrocalcinosis of knee on their problem list.    Medications:  Reviewed in EMR. See EMR for complete list of medications and doses.    Allergies:  Patient has no known allergies.    Social History:  - Tobacco:  reports that she has never smoked. She has never used smokeless tobacco.   - Alcohol:  reports no history of alcohol use.   - Illicit Drugs:  reports no history of drug use.     ROS:  Per HPI.       ???????????????????????????????????????????????????????????????  Triage Vitals:  T 37.2 °C (99 °F)  HR 78  BP (!) 196/65  RR 16  O2 (!) 92 % None (Room air)    Physical  Exam  ???????????????????????????????????????????????????????????????  GEN: Overall well appearing, no acute distress  HEAD: atraumatic  EYES: PERRL, EOMI, no scleral icterus  ENT: Tolerating secretions.  No posterior pharynx edema or erythema.  Patient does have a hard mass with overlying erythema and tenderness to palpation over the left lower neck.  Neck is supple.  No crepitus appreciated  NECK: no JVD  CVS/CHEST: reg rate, nl rhythm  PULM: CTA b/l no wheezes, crackles, or rhonchi   EXT: no LE edema, 2+ periph pulses in bilat radial and DP   NEURO: Awake and alert, Strength and sensation is equal in b/l upper and lower extremities, normal ambulation  SKIN: warm, dry  PSYCH: AAOx3 answers questions appropriately    Assessment and Plan:  Patient is an 84-year-old female presents the emergency department the mass in her left lower neck.  It started yesterday and per patient has been rapidly getting bigger.  It is also tender to palpation.  May be abscess versus clot.  Ultrasound as well as CT with contrast ordered to better characterize the area.  I have a lower suspicion that this is an allergic reaction given that it is one-sided she has no posterior pharynx edema she has no difficulty breathing or speaking.  She has normal phonation.  Airway is stable.  Disposition pending imaging.  Treated with a dose of Toradol for symptomatic management pending workup.    See ED course.     Patient given strict return precautions if she develops difficulty breathing or swallowing.  Encouraged to stop Plavix until she follows up with ENT.  Patient will see ENT tomorrow.    ED Course:  ED Course as of 03/16/25 1840   Sun Mar 16, 2025   1442 1. 3.3 cm x 3 cm x 3.9 cm round, homogeneous, well-circumscribed mass  within the left visceral space that is intimately associated with the  left lobe of the thyroid gland. It measures 70 HU in density. There  is mild ill-defined edema/fat stranding surrounding the lesion. No  invasive  features but causes splaying of the left IJV and CCA with  mild narrowing of the left IJ. Given the surrounding stranding,  differential diagnosis includes acute hemorrhage into a thyroid cyst  (favored given the diffuse T1 hyperintensity on recent MRA neck)),  hemorrhagic branchial cleft anomaly, less likely hemorrhagic cystic  metastasis. Recommend thyroid ultrasound determine degree of  solid/cystic component and to determine whether this can be aspirated.      2. 1.5 cm x 1.2 cm 0.8 cm submucosal simple cyst lesion in the  anterior wall of the esophagus. 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 Therefore, to more definitively characterize both this and the  above described lesion a contrast enhanced MRI of the neck may be of  further diagnostic advantage to help management/treatment planning.   [HD]   1510 Dr. Avendano reviewed imaged - far from airway - follow up with Dr. Vasquez on Monday - [place] in [facility].  [HD]   1534 1. No evidence for deep venous thrombus  within the limits of this  examination.      2. Large complex cystic lesion seen adjacent to the internal jugular  vein left lobe of the thyroid gland, as was also described on the  recent CT scan. This is possibly a complex cyst arising from the  thyroid gland. Confirmation could be obtained with dedicated thyroid  ultrasound and/or aspiration.   [HD]   1534 Patient comfortable with outpatient ENT follow-up. [HD]      ED Course User Index  [HD] Adelita Singh DO         Diagnoses as of 03/16/25 1840   Thyroid cyst       Disposition:  Discharged in stable condition with return precautions    Adelita Singh DO      Procedures ? SmartLinks last updated 3/16/2025 1:06 PM        Adelita Singh DO  03/16/25 1840

## 2025-03-17 ENCOUNTER — OFFICE VISIT (OUTPATIENT)
Dept: OTOLARYNGOLOGY | Facility: CLINIC | Age: 85
End: 2025-03-17
Payer: MEDICARE

## 2025-03-17 VITALS — WEIGHT: 130 LBS | BODY MASS INDEX: 23.92 KG/M2 | HEIGHT: 62 IN

## 2025-03-17 DIAGNOSIS — R22.1 NECK MASS: Primary | ICD-10-CM

## 2025-03-17 DIAGNOSIS — K22.89 ESOPHAGEAL CYST: ICD-10-CM

## 2025-03-17 PROCEDURE — 1159F MED LIST DOCD IN RCRD: CPT | Performed by: STUDENT IN AN ORGANIZED HEALTH CARE EDUCATION/TRAINING PROGRAM

## 2025-03-17 PROCEDURE — 99204 OFFICE O/P NEW MOD 45 MIN: CPT | Performed by: STUDENT IN AN ORGANIZED HEALTH CARE EDUCATION/TRAINING PROGRAM

## 2025-03-17 RX ORDER — LORAZEPAM 0.5 MG/1
0.5 TABLET ORAL ONCE
Qty: 1 TABLET | Refills: 0 | Status: SHIPPED | OUTPATIENT
Start: 2025-03-17 | End: 2025-03-17

## 2025-03-17 NOTE — PROGRESS NOTES
"HEAD AND NECK SURGERY CONSULT  [facility]    Referring Provider: Juan Alberto LEE  I had the pleasure of seeing Adelia Huddleston as a consultation today for evaluation of a cyst of the left neck.     The patient reports a history of TIA about one week ago. She was recently started on plavix for this. After beginning the medication last Friday, she began to develop mild swelling and tenderness along the left side of her neck. Saturday, after her second dose, the swelling enlarged further, prompting her presentation to the emergency department. There, a CT neck was obtained followed by an ultrasound. Findings most consistent with a hemorrhagic cyst. She denies any issues breathing, voicing, or swallowing. She is not in significant pain but does describe discomfort along the region. She has no prior history of cysts in the neck, no thyroid history whatsoever.     The patient denies having prior trauma or surgery to their head and neck. There is not a personal or family history of blood clots, easy bleeding or bruising, or anesthesia concerns.     Tobacco use: The patient  reports that she has never smoked. She has never used smokeless tobacco.   Alcohol Use: The patient  reports no history of alcohol use.     Physical Examination  Vitals:  Ht 1.575 m (5' 2\")   Wt 59 kg (130 lb)   BMI 23.78 kg/m²   General: Examination reveals a well-developed, well-nourished patient in no apparent distress.  The patient has no audible dysphonia, stridor or airway distress.  The patient is oriented, alert and responsive.    Ears: Bilateral EAC patent, TM obstructed by cerumen  Face: no lesions of the face, symmetric movement  Oral Cavity:  The patient is able to open the mouth widely without trismus.  The floor of mouth and oral tongue are soft, and no mucosal abnormalities are noted on the lips or within the oral cavity.  The oral tongue is fully mobile and midline on protrusion.  The patients dentition is poor.  Oropharynx: " There are no mucosal abnormalities noted within the oropharynx.  The soft palate elevates symmetrically, the tonsils and base of tongue are normal to inspection and palpation.       Salivary glands: There are no palpable masses of the parotid or submandibular glands.   Neuro: Cranial nerves 2-12 are without obvious abnormality.  Neck: The neck is soft and symmetric. There is a firm, round swelling along the base of the neck on the left. There are no overlying skin changes and it is not particularly tender to palpation     DATA REVIEWED:  Labs:  Lab Results   Component Value Date    WBC 9.0 03/16/2025    HGB 15.0 03/16/2025    HCT 44.8 03/16/2025     03/16/2025    NEUTROABS 6.20 (H) 03/16/2025     Lab Results   Component Value Date    HGBA1C 5.7 (H) 12/09/2024        Radiology: I personally reviewed the CT neck, Ultrasound from 3/16 which demonstrated a well circumscribed mass within the left neck lateral to the thyroid and medial to the great vessels. Suggestive of a cystic lesion. There was also noted to be a cyst of the esophagus.        ASSESSMENT and PLAN:    Left neck mass  Esophageal cyst  Discussed with the patient that the mass appears cystic in nature. She has no history to suggest a congenital mass such as a branchial cleft cyst. She also has no risk factors for a malignancy. The story is most consistent with a hemorrhagic cyst, although it is difficult to confirm without further evaluation. We discussed options including FNA today vs observation then aspiration if symptoms do not improve or resolve. She and her family decided on the latter. This is reasonable, particularly given the risk of bleeding with FNA today given her recent story and plavix use.   MRI ordered for further evaluation of esophageal cyst per radiology recommendation  Defer to PCP regarding whether to continue plavix, if a procedure is planned may be prudent to come off it  Strict return precautions discussed at length    I will  call her with results  Carissa Avendano MD

## 2025-03-17 NOTE — LETTER
"March 17, 2025     Kael JOHNSON DO  [address] [facility] [address]    Patient: Adelia Huddleston   YOB: 1940   Date of Visit: 3/17/2025       Dear Dr. Kael JOHNSON DO:    Thank you for referring Adelia Huddleston to me for evaluation. Below are my notes for this consultation.  If you have questions, please do not hesitate to call me. I look forward to following your patient along with you.       Sincerely,     Carissa Avendano MD      CC: No Recipients  ______________________________________________________________________________________    HEAD AND NECK SURGERY CONSULT  [facility]    Referring Provider: Juan Alberto    HPI  I had the pleasure of seeing Adelia Huddleston as a consultation today for evaluation of a cyst of the left neck.     The patient reports a history of TIA about one week ago. She was recently started on plavix for this. After beginning the medication last Friday, she began to develop mild swelling and tenderness along the left side of her neck. Saturday, after her second dose, the swelling enlarged further, prompting her presentation to the emergency department. There, a CT neck was obtained followed by an ultrasound. Findings most consistent with a hemorrhagic cyst. She denies any issues breathing, voicing, or swallowing. She is not in significant pain but does describe discomfort along the region. She has no prior history of cysts in the neck, no thyroid history whatsoever.     The patient denies having prior trauma or surgery to their head and neck. There is not a personal or family history of blood clots, easy bleeding or bruising, or anesthesia concerns.     Tobacco use: The patient  reports that she has never smoked. She has never used smokeless tobacco.   Alcohol Use: The patient  reports no history of alcohol use.     Physical Examination  Vitals:  Ht 1.575 m (5' 2\")   Wt 59 kg (130 lb)   BMI 23.78 kg/m²   General: Examination reveals a well-developed, " well-nourished patient in no apparent distress.  The patient has no audible dysphonia, stridor or airway distress.  The patient is oriented, alert and responsive.    Ears: Bilateral EAC patent, TM obstructed by cerumen  Face: no lesions of the face, symmetric movement  Oral Cavity:  The patient is able to open the mouth widely without trismus.  The floor of mouth and oral tongue are soft, and no mucosal abnormalities are noted on the lips or within the oral cavity.  The oral tongue is fully mobile and midline on protrusion.  The patients dentition is poor.  Oropharynx: There are no mucosal abnormalities noted within the oropharynx.  The soft palate elevates symmetrically, the tonsils and base of tongue are normal to inspection and palpation.       Salivary glands: There are no palpable masses of the parotid or submandibular glands.   Neuro: Cranial nerves 2-12 are without obvious abnormality.  Neck: The neck is soft and symmetric. There is a firm, round swelling along the base of the neck on the left. There are no overlying skin changes and it is not particularly tender to palpation     DATA REVIEWED:  Labs:  Lab Results   Component Value Date    WBC 9.0 03/16/2025    HGB 15.0 03/16/2025    HCT 44.8 03/16/2025     03/16/2025    NEUTROABS 6.20 (H) 03/16/2025     Lab Results   Component Value Date    HGBA1C 5.7 (H) 12/09/2024        Radiology: I personally reviewed the CT neck, Ultrasound from 3/16 which demonstrated a well circumscribed mass within the left neck lateral to the thyroid and medial to the great vessels. Suggestive of a cystic lesion. There was also noted to be a cyst of the esophagus.        ASSESSMENT and PLAN:    Left neck mass  Esophageal cyst  Discussed with the patient that the mass appears cystic in nature. She has no history to suggest a congenital mass such as a branchial cleft cyst. She also has no risk factors for a malignancy. The story is most consistent with a hemorrhagic cyst,  although it is difficult to confirm without further evaluation. We discussed options including FNA today vs observation then aspiration if symptoms do not improve or resolve. She and her family decided on the latter. This is reasonable, particularly given the risk of bleeding with FNA today given her recent story and plavix use.   MRI ordered for further evaluation of esophageal cyst per radiology recommendation  Defer to PCP regarding whether to continue plavix, if a procedure is planned may be prudent to come off it  Strict return precautions discussed at length    I will call her with results  Carissa Avendano MD

## 2025-03-17 NOTE — PROGRESS NOTES
"HEAD AND NECK SURGERY CONSULT  {MNSclinicsite:[id number]::\"[facility]\"}    Referring Provider: Juan Alberto LEE  I had the pleasure of seeing Adelia Huddleston as a consultation today for evaluation of ***.     The patient reports a history of ***.    The patient denies having prior trauma or surgery to their head and neck. There {IS (DEF)/IS NOT:69823} a personal or family history of blood clots, easy bleeding or bruising, or anesthesia concerns. The patient's occupation is ***. They live at home with ***.     Tobacco use: The patient  reports that she has never smoked. She has never used smokeless tobacco.   Alcohol Use: The patient  reports no history of alcohol use.     Physical Examination  Vitals:  Ht 1.575 m (5' 2\")   Wt 59 kg (130 lb)   BMI 23.78 kg/m²   General: Examination reveals a well-developed, well-nourished patient in no apparent distress.***  The patient has no audible dysphonia, stridor or airway distress.  The patient is oriented, alert and responsive.    Ears: Bilateral EAC patent, TM visualized and intact, no middle ear effusion ***  Face: no lesions of the face, symmetric movement  Oral Cavity:  The patient is able to open the mouth widely without trismus.  The floor of mouth and oral tongue are soft, and no mucosal abnormalities are noted on the lips or within the oral cavity.***  The oral tongue is fully mobile and midline on protrusion.  The patient's teeth are ***  Oropharynx: There are no*** mucosal abnormalities noted within the oropharynx.  The soft palate elevates symmetrically, the tonsils and base of tongue are normal to inspection and palpation.       Salivary glands: There are no palpable masses of the parotid or submandibular glands.   Neuro: Cranial nerves 2-12 are without obvious abnormality.***  Neck: The neck is soft and symmetric.***  There is no palpable adenopathy.     DATA REVIEWED:  Labs:  Lab Results   Component Value Date    WBC 9.0 03/16/2025    HGB 15.0 03/16/2025 "    HCT 44.8 03/16/2025     03/16/2025    NEUTROABS 6.20 (H) 03/16/2025     Lab Results   Component Value Date    HGBA1C 5.7 (H) 12/09/2024        Radiology: I personally reviewed the *** from *** which demonstrated ***.     Discussions with other providers:   I discussed this case with  ***.     Review of prior medical records: I reviewed the patient's medical records which included a clinic note from  *** dated *** in which he detailed ***.     Pathology: I reviewed the pathology report from the biopsy *** which demonstrated     ASSESSMENT and PLAN:    ***

## 2025-03-19 ENCOUNTER — APPOINTMENT (OUTPATIENT)
Dept: PRIMARY CARE | Facility: CLINIC | Age: 85
End: 2025-03-19
Payer: MEDICARE

## 2025-03-19 VITALS
SYSTOLIC BLOOD PRESSURE: 130 MMHG | BODY MASS INDEX: 23.78 KG/M2 | OXYGEN SATURATION: 97 % | DIASTOLIC BLOOD PRESSURE: 84 MMHG | WEIGHT: 130 LBS | HEART RATE: 68 BPM

## 2025-03-19 DIAGNOSIS — R53.1 WEAKNESS: ICD-10-CM

## 2025-03-19 DIAGNOSIS — I10 BENIGN ESSENTIAL HYPERTENSION: ICD-10-CM

## 2025-03-19 DIAGNOSIS — R22.1 LUMP ON NECK: Primary | ICD-10-CM

## 2025-03-19 LAB — TSH SERPL-ACNC: 1.76 MIU/L (ref 0.44–3.98)

## 2025-03-19 PROCEDURE — 99214 OFFICE O/P EST MOD 30 MIN: CPT

## 2025-03-19 PROCEDURE — 1159F MED LIST DOCD IN RCRD: CPT

## 2025-03-19 PROCEDURE — 3075F SYST BP GE 130 - 139MM HG: CPT

## 2025-03-19 PROCEDURE — 3079F DIAST BP 80-89 MM HG: CPT

## 2025-03-19 PROCEDURE — G2211 COMPLEX E/M VISIT ADD ON: HCPCS

## 2025-03-19 PROCEDURE — 1036F TOBACCO NON-USER: CPT

## 2025-03-19 PROCEDURE — 1160F RVW MEDS BY RX/DR IN RCRD: CPT

## 2025-03-19 RX ORDER — LISINOPRIL AND HYDROCHLOROTHIAZIDE 10; 12.5 MG/1; MG/1
1 TABLET ORAL DAILY
Qty: 90 TABLET | Refills: 3 | Status: SHIPPED | OUTPATIENT
Start: 2025-03-19 | End: 2026-03-19

## 2025-03-19 ASSESSMENT — PATIENT HEALTH QUESTIONNAIRE - PHQ9
1. LITTLE INTEREST OR PLEASURE IN DOING THINGS: NOT AT ALL
SUM OF ALL RESPONSES TO PHQ9 QUESTIONS 1 AND 2: 0
2. FEELING DOWN, DEPRESSED OR HOPELESS: NOT AT ALL

## 2025-03-19 NOTE — PATIENT INSTRUCTIONS
Continue lisinopril hydrochlorothiazide 10-12.5mg    Consider restarting aspirin every other day or can wait until after MRI of the neck  There is a risk of not taking aspirin of having another stroke  Call me with questions

## 2025-03-19 NOTE — ASSESSMENT & PLAN NOTE
BP looks good today in the office, well controlled  Recommend she continue lisinopril hydrochlorothiazide 10-12.5mg  Recommend she continue to monitor BP - call if elevating, need good BP control given recent HTN emergency/TIA  Orders:    lisinopriL-hydrochlorothiazide 10-12.5 mg tablet; Take 1 tablet by mouth once daily.

## 2025-03-19 NOTE — PROGRESS NOTES
Subjective   Patient ID: Adelia Huddleston is a 84 y.o. female who presents for discuss if she is to start Aspirin again (Blood Cyst on neck).  HPI  Adelia presents for BP follow up   -She was here for nurse visit to check BP, which looks good but she recently was in the ER again for a cyst on her neck that she was bleeding into  -She stopped plavix and aspirin   -She does feel the cyst is getting smaller and does hurt less   -She does still have some weakness, tiredness   -She denies visual changes, headache, dizziness, chest pain, SOB    Past Surgical History:   Procedure Laterality Date    CATARACT EXTRACTION  07/05/2013    Cataract Surgery      History reviewed. No pertinent past medical history.  Social History     Tobacco Use    Smoking status: Never    Smokeless tobacco: Never   Vaping Use    Vaping status: Never Used   Substance Use Topics    Alcohol use: Never    Drug use: Never        Review of Systems  10 point review of systems performed and is negative except as noted in the HPI.      Current Outpatient Medications:     cholecalciferol (Vitamin D-3) 50 mcg (2,000 unit) capsule, Take by mouth., Disp: , Rfl:     fish oil concentrate (Omega-3) 120-180 mg capsule, Take 1 capsule (1 g) by mouth 2 times a day., Disp: , Rfl:     red yeast rice 600 mg capsule, Take by mouth once daily., Disp: , Rfl:     rosuvastatin (Crestor) 10 mg tablet, Take 1 tablet (10 mg) by mouth once daily at bedtime., Disp: 90 tablet, Rfl: 3    aspirin 81 mg EC tablet, Take 1 tablet (81 mg) by mouth once daily. (Patient not taking: Reported on 3/19/2025), Disp: 30 tablet, Rfl: 11    ciclopirox (Loprox) 0.77 % suspension, Apply 1 Application topically 2 times a day., Disp: 30 mL, Rfl: 0    clopidogrel (Plavix) 75 mg tablet, Take 1 tablet (75 mg) by mouth once daily for 21 days. (Patient not taking: Reported on 3/19/2025), Disp: 21 tablet, Rfl: 0    lisinopriL-hydrochlorothiazide 10-12.5 mg tablet, Take 1 tablet by mouth once daily., Disp: 90  tablet, Rfl: 3    LORazepam (Ativan) 0.5 mg tablet, Take 1 tablet (0.5 mg) by mouth 1 time for 1 dose. Take 30 mins to 1 hours prior to MRI on 4/2 (Patient not taking: Reported on 3/19/2025), Disp: 1 tablet, Rfl: 0     Objective   /84   Pulse 68   Wt 59 kg (130 lb)   SpO2 97%   BMI 23.78 kg/m²     Physical Exam  Constitutional:       General: She is not in acute distress.     Appearance: She is not ill-appearing.   HENT:      Head: Normocephalic and atraumatic.   Eyes:      Extraocular Movements: Extraocular movements intact.      Pupils: Pupils are equal, round, and reactive to light.   Neck:        Comments: Area of swelling seen on L side of anterior neck, mildly tender to palpation. No bruising seen.   Cardiovascular:      Rate and Rhythm: Normal rate and regular rhythm.      Heart sounds: Murmur (RUSB) heard.   Pulmonary:      Effort: Pulmonary effort is normal.      Breath sounds: Normal breath sounds. No wheezing, rhonchi or rales.   Neurological:      Mental Status: She is alert and oriented to person, place, and time.      Gait: Gait normal.         Assessment & Plan  Benign essential hypertension  BP looks good today in the office, well controlled  Recommend she continue lisinopril hydrochlorothiazide 10-12.5mg  Recommend she continue to monitor BP - call if elevating, need good BP control given recent HTN emergency/TIA  Orders:    lisinopriL-hydrochlorothiazide 10-12.5 mg tablet; Take 1 tablet by mouth once daily.    Lump on neck  She presented to the ER 3/16/25 for a lump on the L side of her neck that hurt and was getting bigger  3/14/25 she had started plavix given recent concern for TIA and ABCD risk score of 4 for stroke, she was also on asa 81mg   CT of neck 3/16/25 showed - round homogenous mass - ddx of acute hemorrhage into a thyroid cyst  Vascular US 3/16/25 - of internal jugular vein was wnl   She was discharged from the ER, holding plavix and asa  She saw ENT the following day  3/17/25, discussed likely hemorrhagic cyst, no risk factors for malignancy, discussed they will observe the cyst, if improvement then will not aspirate but if does not improve then will aspirate   Discussed given her hemorrhagic cyst, she likely should not resume plavix, did discuss in detail the stroke risk, which she states she understands and discussed importance of good BP control  Dicussed re-starting asa 81mg, discussed again risk benefits with this, discussed she could stay off until she has MR of her neck in 2 weeks but again discussed stroke risk, she may resume every other day - discussed I think she should restart given her risk but ultimately up to her, she states she understands   Discussed if not taking asa 81mg and she notices any visual changes again, AMS, high BP she has to go to the ER immediately   Orders:    TSH with reflex to Free T4 if abnormal; Future    Weakness  Will check TSH given nodule that was seen on thyroid, she also needs thyroid US  Orders:    TSH with reflex to Free T4 if abnormal; Future          Discussed at visit any disease processes that were of concern as well as the risks, benefits and instructions on any new medication provided. Patient (and/or caretaker of patient if present) stated all questions were answered, and they voiced understanding of instructions.      Masha Khalil PA-C

## 2025-04-02 ENCOUNTER — HOSPITAL ENCOUNTER (OUTPATIENT)
Dept: RADIOLOGY | Facility: HOSPITAL | Age: 85
Discharge: HOME | End: 2025-04-02
Payer: MEDICARE

## 2025-04-02 PROCEDURE — A9575 INJ GADOTERATE MEGLUMI 0.1ML: HCPCS | Performed by: STUDENT IN AN ORGANIZED HEALTH CARE EDUCATION/TRAINING PROGRAM

## 2025-04-02 PROCEDURE — 70543 MRI ORBT/FAC/NCK W/O &W/DYE: CPT

## 2025-04-02 PROCEDURE — 2550000001 HC RX 255 CONTRASTS: Performed by: STUDENT IN AN ORGANIZED HEALTH CARE EDUCATION/TRAINING PROGRAM

## 2025-04-02 RX ORDER — GADOTERATE MEGLUMINE 376.9 MG/ML
0.2 INJECTION INTRAVENOUS
Status: COMPLETED | OUTPATIENT
Start: 2025-04-02 | End: 2025-04-02

## 2025-04-02 RX ADMIN — GADOTERATE MEGLUMINE 12 ML: 376.9 INJECTION INTRAVENOUS at 16:26

## 2025-04-03 ENCOUNTER — TELEPHONE (OUTPATIENT)
Dept: PRIMARY CARE | Facility: CLINIC | Age: 85
End: 2025-04-03
Payer: MEDICARE

## 2025-04-04 ENCOUNTER — APPOINTMENT (OUTPATIENT)
Dept: PRIMARY CARE | Facility: CLINIC | Age: 85
End: 2025-04-04
Payer: MEDICARE

## 2025-04-07 ENCOUNTER — TELEPHONE (OUTPATIENT)
Dept: OTOLARYNGOLOGY | Facility: HOSPITAL | Age: 85
End: 2025-04-07
Payer: MEDICARE

## 2025-04-07 NOTE — TELEPHONE ENCOUNTER
Called [facility] to discuss MRI results. Recommend image-guided biopsy of thyroid mass, she is unsure if she wants the biopsy. Discussed observation with US. She requested I call her son Tomás, discussed with him. They will call and let me know if they would like to proceed with biopsy v observation.

## 2025-04-14 DIAGNOSIS — E07.9 THYROID MASS: ICD-10-CM

## 2025-04-21 ENCOUNTER — HOSPITAL ENCOUNTER (OUTPATIENT)
Dept: RADIOLOGY | Facility: HOSPITAL | Age: 85
Discharge: HOME | End: 2025-04-21
Payer: MEDICARE

## 2025-04-21 ENCOUNTER — HOSPITAL ENCOUNTER (OUTPATIENT)
Dept: CARDIOLOGY | Facility: HOSPITAL | Age: 85
Discharge: HOME | End: 2025-04-21
Payer: MEDICARE

## 2025-04-21 ENCOUNTER — HOSPITAL ENCOUNTER (OUTPATIENT)
Dept: VASCULAR MEDICINE | Facility: HOSPITAL | Age: 85
Discharge: HOME | End: 2025-04-21
Payer: MEDICARE

## 2025-04-21 DIAGNOSIS — G45.9 TRANSIENT ISCHEMIC ATTACK: ICD-10-CM

## 2025-04-21 DIAGNOSIS — H53.2 DIPLOPIA: ICD-10-CM

## 2025-04-21 PROCEDURE — 93880 EXTRACRANIAL BILAT STUDY: CPT

## 2025-04-21 PROCEDURE — 93306 TTE W/DOPPLER COMPLETE: CPT

## 2025-04-21 PROCEDURE — 76536 US EXAM OF HEAD AND NECK: CPT | Performed by: RADIOLOGY

## 2025-04-21 PROCEDURE — 93880 EXTRACRANIAL BILAT STUDY: CPT | Performed by: SURGERY

## 2025-04-21 PROCEDURE — 76536 US EXAM OF HEAD AND NECK: CPT

## 2025-04-28 LAB
AORTIC VALVE MEAN GRADIENT: 6 MMHG
AORTIC VALVE PEAK VELOCITY: 1.74 M/S
AV PEAK GRADIENT: 12 MMHG
AVA (PEAK VEL): 2.15 CM2
AVA (VTI): 2.41 CM2
EJECTION FRACTION APICAL 4 CHAMBER: 64.9
EJECTION FRACTION: 62 %
LEFT ATRIUM VOLUME AREA LENGTH INDEX BSA: 19 ML/M2
LEFT VENTRICLE INTERNAL DIMENSION DIASTOLE: 3.54 CM (ref 3.5–6)
LEFT VENTRICULAR OUTFLOW TRACT DIAMETER: 1.9 CM
MITRAL VALVE E/A RATIO: 0.66
RIGHT VENTRICLE FREE WALL PEAK S': 13.1 CM/S
RIGHT VENTRICLE PEAK SYSTOLIC PRESSURE: 26 MMHG
TRICUSPID ANNULAR PLANE SYSTOLIC EXCURSION: 2 CM

## 2025-04-30 ENCOUNTER — HOSPITAL ENCOUNTER (OUTPATIENT)
Dept: RADIOLOGY | Facility: HOSPITAL | Age: 85
Discharge: HOME | End: 2025-04-30
Payer: MEDICARE

## 2025-04-30 VITALS
OXYGEN SATURATION: 96 % | RESPIRATION RATE: 18 BRPM | DIASTOLIC BLOOD PRESSURE: 73 MMHG | HEART RATE: 74 BPM | SYSTOLIC BLOOD PRESSURE: 163 MMHG

## 2025-04-30 DIAGNOSIS — E07.9 THYROID MASS: ICD-10-CM

## 2025-04-30 PROCEDURE — 10160 PNXR ASPIR ABSC HMTMA BULLA: CPT | Performed by: RADIOLOGY

## 2025-04-30 PROCEDURE — 76942 ECHO GUIDE FOR BIOPSY: CPT

## 2025-04-30 PROCEDURE — 76942 ECHO GUIDE FOR BIOPSY: CPT | Performed by: RADIOLOGY

## 2025-04-30 ASSESSMENT — PAIN SCALES - GENERAL: PAINLEVEL_OUTOF10: 0 - NO PAIN

## 2025-04-30 NOTE — DISCHARGE INSTRUCTIONS
Okay to use ice as needed for pain  Okay to take tylenol as needed for pain  Notify MD with any s/s of infection or active bleeding  Okay to remove band-aid tomorrow  Follow up with Dr. Avendano in 10 days for results

## 2025-05-01 NOTE — POST-PROCEDURE NOTE
Interventional Radiology Brief Postprocedure Note    Attending: Teja Valdivia MD      Assistant: hoda      Diagnosis: left thyr lobe cystic mass    Description of procedure: US guided aspiration     Anesthesia:  Local    Complications: None    Estimated Blood Loss: none    Medications (Filter: Administrations occurring from 1251 to 1309 on 04/30/25)      None          ID Type Source Tests Collected by Time   1 : LEFT THYROID BIOPSY Non-Gynecologic Cytology THYROID FINE NEEDLE ASPIRATION LEFT MID LOBE CYTOLOGY CONSULTATION (NON-GYNECOLOGIC) Teja Valdivia MD 4/30/2025 125         See detailed result report with images in PACS.    The patient tolerated the procedure well without incident or complication and is in stable condition.

## 2025-05-02 ENCOUNTER — TELEPHONE (OUTPATIENT)
Dept: OTOLARYNGOLOGY | Facility: HOSPITAL | Age: 85
End: 2025-05-02
Payer: MEDICARE

## 2025-05-02 LAB
LABORATORY COMMENT REPORT: NORMAL
LABORATORY COMMENT REPORT: NORMAL
PATH REPORT.FINAL DX SPEC: NORMAL
PATH REPORT.GROSS SPEC: NORMAL
PATH REPORT.TOTAL CANCER: NORMAL

## 2025-05-02 NOTE — TELEPHONE ENCOUNTER
Called patient to relay biopsy results, non diagnostic, blood and inflammatory cells consistent with hemorrhagic thyroid cyst. Will monitor with repeat US. No answer, unable to leave voicemail. Left voicemail with son Tomás

## 2025-05-05 ENCOUNTER — TELEPHONE (OUTPATIENT)
Dept: PRIMARY CARE | Facility: CLINIC | Age: 85
End: 2025-05-05
Payer: MEDICARE

## 2025-05-05 DIAGNOSIS — I65.21 CAROTID STENOSIS, RIGHT: Primary | ICD-10-CM

## 2025-05-29 ENCOUNTER — OFFICE VISIT (OUTPATIENT)
Dept: VASCULAR SURGERY | Facility: CLINIC | Age: 85
End: 2025-05-29
Payer: MEDICARE

## 2025-05-29 VITALS
SYSTOLIC BLOOD PRESSURE: 187 MMHG | HEIGHT: 62 IN | OXYGEN SATURATION: 95 % | BODY MASS INDEX: 24.59 KG/M2 | DIASTOLIC BLOOD PRESSURE: 67 MMHG | HEART RATE: 62 BPM | WEIGHT: 133.6 LBS

## 2025-05-29 DIAGNOSIS — I65.21 CAROTID STENOSIS, RIGHT: ICD-10-CM

## 2025-05-29 PROCEDURE — 1159F MED LIST DOCD IN RCRD: CPT | Performed by: SURGERY

## 2025-05-29 PROCEDURE — 3078F DIAST BP <80 MM HG: CPT | Performed by: SURGERY

## 2025-05-29 PROCEDURE — 1036F TOBACCO NON-USER: CPT | Performed by: SURGERY

## 2025-05-29 PROCEDURE — 1126F AMNT PAIN NOTED NONE PRSNT: CPT | Performed by: SURGERY

## 2025-05-29 PROCEDURE — 99214 OFFICE O/P EST MOD 30 MIN: CPT | Performed by: SURGERY

## 2025-05-29 PROCEDURE — 1160F RVW MEDS BY RX/DR IN RCRD: CPT | Performed by: SURGERY

## 2025-05-29 PROCEDURE — 3077F SYST BP >= 140 MM HG: CPT | Performed by: SURGERY

## 2025-05-29 PROCEDURE — 99204 OFFICE O/P NEW MOD 45 MIN: CPT | Performed by: SURGERY

## 2025-05-29 ASSESSMENT — PAIN SCALES - GENERAL: PAINLEVEL_OUTOF10: 0-NO PAIN

## 2025-05-29 NOTE — PATIENT INSTRUCTIONS
- Recommend carotid US in 1 year to monitor stenosis  - Call with any episodes of arm or leg weakness, slurred speech, or blindness in one eye  - Followup 1 year  - Call office [phone] with any questions or concerns

## 2025-05-29 NOTE — PROGRESS NOTES
Vascular Surgery Clinic Note    CC: Evaluation for carotid stenosis    HPI:  Adelia Huddleston is 85 y.o. female with history of right eye diplopia in March sent for evaluation for possible symptomatic right carotid stenosis.  Part of her workup included a carotid ultrasound which demonstrates bilateral mild stenosis. Her MRI brain was negative for stroke. She does not smoke. She denies episodes of hemiparesis, slurred speech or amaurosis. She has not had further episodes of diplopia and did undergo an ophthalmology evaluation which was negative for significant findings. Part of her workup on carotid US was incidentally noted left thyroid cyst which has been evaluated by ENT and found to be a hemorrhagic cyst. She has no history of neck irradiation or surgeries. She is very functional at baseline and independent in her daily activities of living.    Past Vascular History:  None    Past Vascular Testing:  Carotid US (4/21/25) - R 158/21 (2.1), L 163/24 (1.6)  MRI brain (3/9/25)    Medical History:  Problem List[1]     Meds:   Medications Ordered Prior to Encounter[2]     Allergies:   RX Allergies[3]    SH:    Social Drivers of Health     Tobacco Use: Low Risk  (5/29/2025)    Patient History     Smoking Tobacco Use: Never     Smokeless Tobacco Use: Never     Passive Exposure: Not on file   Alcohol Use: Not on file   Financial Resource Strain: Not on file   Food Insecurity: Not on file   Transportation Needs: Not on file   Physical Activity: Not on file   Stress: Not on file   Social Connections: Not on file   Intimate Partner Violence: Not on file   Depression: Not at risk (5/29/2025)    PHQ-2     PHQ-2 Score: 0   Housing Stability: Not on file   Utilities: Not on file   Digital Equity: Not on file   Health Literacy: Not on file        FH:  Family History[4]     ROS:  All systems were reviewed and are negative except as per HPI.    Objective:  Vitals:  Vitals:    05/29/25 0857   BP: (!) 187/67   Pulse: 62   SpO2:          Exam:  Constitutional: normal, well appearing  HEENT: normocephalic  CV: regular rate  RESP: symmetric expansion, unlabored breathing  GI: soft, nontender, nondistended  MSK: normal ROM  INT: no lesions  PSYCH: appropriate mood  NEURO: no deficits  VASC: palpable radial bilaterally, normal neck ROM    Assessment & Plan:  Adelia Huddleston is 85 y.o. female with hx of diplopia and BL carotid stenosis    - Reviewed images and ultrasound which is suggestive of mild carotid stenoses bilaterally. Her diplopia is unlikely to be from the carotid stenosis. Her MRI was negative. Would classify this as asymptomatic carotid stenosis  - Recommend best medical therapy with continued ASA 81 mg, statin, and continued avoidance of tobacco  - Carotid US in 1 year with followup then    Meds  - ASA 81 mg  - Rosuvastatin 10 mg    Screening/Surveillance  - Carotid US (May 2026)    Next Followup  - 1 year    Josselin Adams MD                 [1]   Patient Active Problem List  Diagnosis    Asymptomatic PVCs    Benign essential hypertension    Benign familial tremor    Carotid artery stenosis    Chronic pain of left knee    Hypercholesterolemia    Joint pain, hip    Myalgia    Chondrocalcinosis of left knee    Chondrocalcinosis of knee   [2]   Current Outpatient Medications on File Prior to Visit   Medication Sig Dispense Refill    aspirin 81 mg EC tablet Take 1 tablet (81 mg) by mouth once daily. 30 tablet 11    cholecalciferol (Vitamin D-3) 50 mcg (2,000 unit) capsule Take by mouth.      fish oil concentrate (Omega-3) 120-180 mg capsule Take 1 capsule (1 g) by mouth 2 times a day.      lisinopriL-hydrochlorothiazide 10-12.5 mg tablet Take 1 tablet by mouth once daily. 90 tablet 3    red yeast rice 600 mg capsule Take by mouth once daily.      rosuvastatin (Crestor) 10 mg tablet Take 1 tablet (10 mg) by mouth once daily at bedtime. 90 tablet 3    ciclopirox (Loprox) 0.77 % suspension Apply 1 Application topically 2 times a day. (Patient not  taking: Reported on 5/29/2025) 30 mL 0    LORazepam (Ativan) 0.5 mg tablet Take 1 tablet (0.5 mg) by mouth 1 time for 1 dose. Take 30 mins to 1 hours prior to MRI on 4/2 (Patient not taking: Reported on 3/19/2025) 1 tablet 0     No current facility-administered medications on file prior to visit.   [3] No Known Allergies  [4] No family history on file.